# Patient Record
Sex: FEMALE | Race: WHITE | NOT HISPANIC OR LATINO | Employment: FULL TIME | URBAN - METROPOLITAN AREA
[De-identification: names, ages, dates, MRNs, and addresses within clinical notes are randomized per-mention and may not be internally consistent; named-entity substitution may affect disease eponyms.]

---

## 2019-06-28 ENCOUNTER — HOSPITAL ENCOUNTER (OUTPATIENT)
Dept: URGENT CARE | Facility: CLINIC | Age: 39
Discharge: HOME | End: 2019-06-28
Attending: PHYSICIAN ASSISTANT

## 2019-07-10 ENCOUNTER — HOSPITAL ENCOUNTER (OUTPATIENT)
Dept: OTHER | Facility: HOSPITAL | Age: 39
Discharge: HOME | End: 2019-07-10
Attending: EMERGENCY MEDICINE

## 2019-07-13 LAB
CONV QUANTIFERON TB GOLD: NEGATIVE
QUANTIFERON CRITERIA: NORMAL
QUANTIFERON MITOGEN VALUE: >10 IU/ML
QUANTIFERON NIL VALUE: 0.03 IU/ML
QUANTIFERON TB1 AG VALUE: 0.03 IU/ML
QUANTIFERON TB2 AG VALUE: 0.04 IU/ML

## 2019-08-05 ENCOUNTER — HOSPITAL ENCOUNTER (OUTPATIENT)
Dept: LAB | Facility: HOSPITAL | Age: 39
Discharge: HOME | End: 2019-08-05
Attending: PHYSICIAN ASSISTANT

## 2019-08-05 LAB
25(OH)D3 SERPL-MCNC: 38 NG/ML (ref 30–100)
ALBUMIN SERPL-MCNC: 4.4 G/DL (ref 3.5–5)
ALBUMIN/GLOB SERPL: 1.4 {RATIO} (ref 1.4–2.6)
ALP SERPL-CCNC: 132 U/L (ref 42–98)
ALT SERPL-CCNC: 33 U/L (ref 10–40)
ANION GAP SERPL CALC-SCNC: 18 MMOL/L (ref 8–19)
AST SERPL-CCNC: 26 U/L (ref 15–50)
BASOPHILS # BLD AUTO: 0.04 10*3/UL (ref 0–0.2)
BASOPHILS NFR BLD AUTO: 0.6 % (ref 0–3)
BILIRUB SERPL-MCNC: 0.37 MG/DL (ref 0.2–1.3)
BUN SERPL-MCNC: 11 MG/DL (ref 5–25)
BUN/CREAT SERPL: 17 {RATIO} (ref 6–20)
CALCIUM SERPL-MCNC: 9.7 MG/DL (ref 8.7–10.4)
CHLORIDE SERPL-SCNC: 103 MMOL/L (ref 99–111)
CONV ABS IMM GRAN: 0.02 10*3/UL (ref 0–0.2)
CONV CO2: 23 MMOL/L (ref 22–32)
CONV IMMATURE GRAN: 0.3 % (ref 0–1.8)
CONV TOTAL PROTEIN: 7.6 G/DL (ref 6.3–8.2)
CREAT UR-MCNC: 0.64 MG/DL (ref 0.5–0.9)
DEPRECATED RDW RBC AUTO: 44.9 FL (ref 36.4–46.3)
EOSINOPHIL # BLD AUTO: 0.32 10*3/UL (ref 0–0.7)
EOSINOPHIL # BLD AUTO: 4.7 % (ref 0–7)
ERYTHROCYTE [DISTWIDTH] IN BLOOD BY AUTOMATED COUNT: 13.5 % (ref 11.7–14.4)
GFR SERPLBLD BASED ON 1.73 SQ M-ARVRAT: >60 ML/MIN/{1.73_M2}
GLOBULIN UR ELPH-MCNC: 3.2 G/DL (ref 2–3.5)
GLUCOSE SERPL-MCNC: 100 MG/DL (ref 65–99)
HCT VFR BLD AUTO: 46.1 % (ref 37–47)
HGB BLD-MCNC: 15 G/DL (ref 12–16)
LYMPHOCYTES # BLD AUTO: 2.77 10*3/UL (ref 1–5)
LYMPHOCYTES NFR BLD AUTO: 40.6 % (ref 20–45)
MCH RBC QN AUTO: 29.5 PG (ref 27–31)
MCHC RBC AUTO-ENTMCNC: 32.5 G/DL (ref 33–37)
MCV RBC AUTO: 90.6 FL (ref 81–99)
MONOCYTES # BLD AUTO: 0.45 10*3/UL (ref 0.2–1.2)
MONOCYTES NFR BLD AUTO: 6.6 % (ref 3–10)
NEUTROPHILS # BLD AUTO: 3.23 10*3/UL (ref 2–8)
NEUTROPHILS NFR BLD AUTO: 47.2 % (ref 30–85)
NRBC CBCN: 0 % (ref 0–0.7)
OSMOLALITY SERPL CALC.SUM OF ELEC: 289 MOSM/KG (ref 273–304)
PLATELET # BLD AUTO: 261 10*3/UL (ref 130–400)
PMV BLD AUTO: 11 FL (ref 9.4–12.3)
POTASSIUM SERPL-SCNC: 4.3 MMOL/L (ref 3.5–5.3)
RBC # BLD AUTO: 5.09 10*6/UL (ref 4.2–5.4)
SODIUM SERPL-SCNC: 140 MMOL/L (ref 135–147)
T4 FREE SERPL-MCNC: 1.3 NG/DL (ref 0.9–1.8)
TSH SERPL-ACNC: 0.96 M[IU]/L (ref 0.27–4.2)
WBC # BLD AUTO: 6.83 10*3/UL (ref 4.8–10.8)

## 2020-02-24 ENCOUNTER — HOSPITAL ENCOUNTER (OUTPATIENT)
Dept: URGENT CARE | Facility: CLINIC | Age: 40
Discharge: HOME | End: 2020-02-24
Attending: NURSE PRACTITIONER

## 2020-02-26 LAB — BACTERIA SPEC AEROBE CULT: NORMAL

## 2020-08-12 ENCOUNTER — HOSPITAL ENCOUNTER (OUTPATIENT)
Dept: LAB | Facility: HOSPITAL | Age: 40
Discharge: HOME | End: 2020-08-12
Attending: PHYSICIAN ASSISTANT

## 2020-08-12 LAB
25(OH)D3 SERPL-MCNC: 29 NG/ML (ref 30–100)
ALBUMIN SERPL-MCNC: 4.2 G/DL (ref 3.5–5)
ALBUMIN/GLOB SERPL: 1.4 {RATIO} (ref 1.4–2.6)
ALP SERPL-CCNC: 117 U/L (ref 42–98)
ALT SERPL-CCNC: 31 U/L (ref 10–40)
ANION GAP SERPL CALC-SCNC: 24 MMOL/L (ref 8–19)
AST SERPL-CCNC: 24 U/L (ref 15–50)
BASOPHILS # BLD AUTO: 0.04 10*3/UL (ref 0–0.2)
BASOPHILS NFR BLD AUTO: 0.5 % (ref 0–3)
BILIRUB SERPL-MCNC: 0.26 MG/DL (ref 0.2–1.3)
BUN SERPL-MCNC: 12 MG/DL (ref 5–25)
BUN/CREAT SERPL: 15 {RATIO} (ref 6–20)
CALCIUM SERPL-MCNC: 10 MG/DL (ref 8.7–10.4)
CHLORIDE SERPL-SCNC: 109 MMOL/L (ref 99–111)
CHOLEST SERPL-MCNC: 255 MG/DL (ref 107–200)
CHOLEST/HDLC SERPL: 5.2 {RATIO} (ref 3–6)
CONV ABS IMM GRAN: 0.01 10*3/UL (ref 0–0.2)
CONV CO2: 18 MMOL/L (ref 22–32)
CONV IMMATURE GRAN: 0.1 % (ref 0–1.8)
CONV TOTAL PROTEIN: 7.3 G/DL (ref 6.3–8.2)
CREAT UR-MCNC: 0.78 MG/DL (ref 0.5–0.9)
DEPRECATED RDW RBC AUTO: 46.8 FL (ref 36.4–46.3)
EOSINOPHIL # BLD AUTO: 0.34 10*3/UL (ref 0–0.7)
EOSINOPHIL # BLD AUTO: 4.5 % (ref 0–7)
ERYTHROCYTE [DISTWIDTH] IN BLOOD BY AUTOMATED COUNT: 13.9 % (ref 11.7–14.4)
EST. AVERAGE GLUCOSE BLD GHB EST-MCNC: 131 MG/DL
GFR SERPLBLD BASED ON 1.73 SQ M-ARVRAT: >60 ML/MIN/{1.73_M2}
GLOBULIN UR ELPH-MCNC: 3.1 G/DL (ref 2–3.5)
GLUCOSE SERPL-MCNC: 113 MG/DL (ref 65–99)
HBA1C MFR BLD: 6.2 % (ref 3.5–5.7)
HCT VFR BLD AUTO: 49.3 % (ref 37–47)
HDLC SERPL-MCNC: 49 MG/DL (ref 40–60)
HGB BLD-MCNC: 15.6 G/DL (ref 12–16)
LDLC SERPL CALC-MCNC: 171 MG/DL (ref 70–100)
LYMPHOCYTES # BLD AUTO: 3.3 10*3/UL (ref 1–5)
LYMPHOCYTES NFR BLD AUTO: 43.7 % (ref 20–45)
MCH RBC QN AUTO: 29.3 PG (ref 27–31)
MCHC RBC AUTO-ENTMCNC: 31.6 G/DL (ref 33–37)
MCV RBC AUTO: 92.5 FL (ref 81–99)
MONOCYTES # BLD AUTO: 0.49 10*3/UL (ref 0.2–1.2)
MONOCYTES NFR BLD AUTO: 6.5 % (ref 3–10)
NEUTROPHILS # BLD AUTO: 3.37 10*3/UL (ref 2–8)
NEUTROPHILS NFR BLD AUTO: 44.7 % (ref 30–85)
NRBC CBCN: 0 % (ref 0–0.7)
OSMOLALITY SERPL CALC.SUM OF ELEC: 303 MOSM/KG (ref 273–304)
PLATELET # BLD AUTO: 216 10*3/UL (ref 130–400)
PMV BLD AUTO: 11.7 FL (ref 9.4–12.3)
POTASSIUM SERPL-SCNC: 4.7 MMOL/L (ref 3.5–5.3)
RBC # BLD AUTO: 5.33 10*6/UL (ref 4.2–5.4)
SODIUM SERPL-SCNC: 146 MMOL/L (ref 135–147)
T4 FREE SERPL-MCNC: 1.2 NG/DL (ref 0.9–1.8)
TRIGL SERPL-MCNC: 177 MG/DL (ref 40–150)
TSH SERPL-ACNC: 1.92 M[IU]/L (ref 0.27–4.2)
VLDLC SERPL-MCNC: 35 MG/DL (ref 5–37)
WBC # BLD AUTO: 7.55 10*3/UL (ref 4.8–10.8)

## 2020-09-04 ENCOUNTER — HOSPITAL ENCOUNTER (OUTPATIENT)
Dept: GENERAL RADIOLOGY | Facility: HOSPITAL | Age: 40
Discharge: HOME | End: 2020-09-04
Attending: PHYSICIAN ASSISTANT

## 2020-11-03 ENCOUNTER — HOSPITAL ENCOUNTER (OUTPATIENT)
Dept: LAB | Facility: HOSPITAL | Age: 40
Discharge: HOME | End: 2020-11-03
Attending: PHYSICIAN ASSISTANT

## 2020-11-03 LAB
ALBUMIN SERPL-MCNC: 4.1 G/DL (ref 3.5–5)
ALBUMIN/GLOB SERPL: 1.3 {RATIO} (ref 1.4–2.6)
ALP SERPL-CCNC: 112 U/L (ref 42–98)
ALT SERPL-CCNC: 91 U/L (ref 10–40)
ANION GAP SERPL CALC-SCNC: 15 MMOL/L (ref 8–19)
AST SERPL-CCNC: 46 U/L (ref 15–50)
BASOPHILS # BLD AUTO: 0.03 10*3/UL (ref 0–0.2)
BASOPHILS NFR BLD AUTO: 0.4 % (ref 0–3)
BILIRUB SERPL-MCNC: 0.45 MG/DL (ref 0.2–1.3)
BUN SERPL-MCNC: 8 MG/DL (ref 5–25)
BUN/CREAT SERPL: 11 {RATIO} (ref 6–20)
CALCIUM SERPL-MCNC: 9.6 MG/DL (ref 8.7–10.4)
CHLORIDE SERPL-SCNC: 108 MMOL/L (ref 99–111)
CONV ABS IMM GRAN: 0.02 10*3/UL (ref 0–0.2)
CONV CO2: 20 MMOL/L (ref 22–32)
CONV IMMATURE GRAN: 0.3 % (ref 0–1.8)
CONV TOTAL PROTEIN: 7.2 G/DL (ref 6.3–8.2)
CREAT UR-MCNC: 0.76 MG/DL (ref 0.5–0.9)
DEPRECATED RDW RBC AUTO: 46.2 FL (ref 36.4–46.3)
EOSINOPHIL # BLD AUTO: 0.14 10*3/UL (ref 0–0.7)
EOSINOPHIL # BLD AUTO: 1.8 % (ref 0–7)
ERYTHROCYTE [DISTWIDTH] IN BLOOD BY AUTOMATED COUNT: 13.5 % (ref 11.7–14.4)
GFR SERPLBLD BASED ON 1.73 SQ M-ARVRAT: >60 ML/MIN/{1.73_M2}
GLOBULIN UR ELPH-MCNC: 3.1 G/DL (ref 2–3.5)
GLUCOSE SERPL-MCNC: 102 MG/DL (ref 65–99)
HCT VFR BLD AUTO: 47.3 % (ref 37–47)
HGB BLD-MCNC: 15 G/DL (ref 12–16)
LYMPHOCYTES # BLD AUTO: 2.85 10*3/UL (ref 1–5)
LYMPHOCYTES NFR BLD AUTO: 37.5 % (ref 20–45)
MCH RBC QN AUTO: 29.5 PG (ref 27–31)
MCHC RBC AUTO-ENTMCNC: 31.7 G/DL (ref 33–37)
MCV RBC AUTO: 92.9 FL (ref 81–99)
MONOCYTES # BLD AUTO: 0.52 10*3/UL (ref 0.2–1.2)
MONOCYTES NFR BLD AUTO: 6.9 % (ref 3–10)
NEUTROPHILS # BLD AUTO: 4.03 10*3/UL (ref 2–8)
NEUTROPHILS NFR BLD AUTO: 53.1 % (ref 30–85)
NRBC CBCN: 0 % (ref 0–0.7)
OSMOLALITY SERPL CALC.SUM OF ELEC: 287 MOSM/KG (ref 273–304)
PLATELET # BLD AUTO: 220 10*3/UL (ref 130–400)
PMV BLD AUTO: 10.6 FL (ref 9.4–12.3)
POTASSIUM SERPL-SCNC: 4 MMOL/L (ref 3.5–5.3)
RBC # BLD AUTO: 5.09 10*6/UL (ref 4.2–5.4)
SODIUM SERPL-SCNC: 139 MMOL/L (ref 135–147)
WBC # BLD AUTO: 7.59 10*3/UL (ref 4.8–10.8)

## 2020-12-18 ENCOUNTER — HOSPITAL ENCOUNTER (OUTPATIENT)
Dept: GENERAL RADIOLOGY | Facility: HOSPITAL | Age: 40
Discharge: HOME | End: 2020-12-18
Attending: PHYSICIAN ASSISTANT

## 2021-05-03 ENCOUNTER — HOSPITAL ENCOUNTER (OUTPATIENT)
Dept: URGENT CARE | Facility: CLINIC | Age: 41
Discharge: HOME | End: 2021-05-03
Attending: EMERGENCY MEDICINE

## 2021-07-07 RX ORDER — ALBUTEROL SULFATE 90 UG/1
2 AEROSOL, METERED RESPIRATORY (INHALATION) EVERY 4 HOURS PRN
Qty: 1 G | Refills: 5 | Status: SHIPPED | OUTPATIENT
Start: 2021-07-07 | End: 2021-08-06

## 2021-09-10 RX ORDER — MONTELUKAST SODIUM 10 MG/1
10 TABLET ORAL NIGHTLY
COMMUNITY
End: 2021-09-10 | Stop reason: SDUPTHER

## 2021-09-12 RX ORDER — MONTELUKAST SODIUM 10 MG/1
10 TABLET ORAL NIGHTLY
Qty: 30 TABLET | Refills: 3 | Status: SHIPPED | OUTPATIENT
Start: 2021-09-12 | End: 2022-02-16

## 2021-10-25 RX ORDER — ESCITALOPRAM OXALATE 10 MG/1
TABLET ORAL
Qty: 30 TABLET | Refills: 0 | Status: SHIPPED | OUTPATIENT
Start: 2021-10-25 | End: 2022-06-27

## 2021-11-01 ENCOUNTER — HOSPITAL ENCOUNTER (INPATIENT)
Facility: HOSPITAL | Age: 41
LOS: 3 days | Discharge: HOME | End: 2021-11-04
Attending: EMERGENCY MEDICINE | Admitting: INTERNAL MEDICINE

## 2021-11-01 ENCOUNTER — APPOINTMENT (OUTPATIENT)
Dept: GENERAL RADIOLOGY | Facility: HOSPITAL | Age: 41
End: 2021-11-01

## 2021-11-01 DIAGNOSIS — J18.9 PNEUMONIA OF BOTH LOWER LOBES DUE TO INFECTIOUS ORGANISM: Primary | ICD-10-CM

## 2021-11-01 LAB
ALBUMIN SERPL-MCNC: 4 G/DL (ref 3.5–5.2)
ALBUMIN/GLOB SERPL: 1.3 G/DL
ALP SERPL-CCNC: 97 U/L (ref 39–117)
ALT SERPL W P-5'-P-CCNC: 32 U/L (ref 1–33)
ANION GAP SERPL CALCULATED.3IONS-SCNC: 11.7 MMOL/L (ref 5–15)
AST SERPL-CCNC: 22 U/L (ref 1–32)
BASOPHILS # BLD AUTO: 0.05 10*3/MM3 (ref 0–0.2)
BASOPHILS NFR BLD AUTO: 0.5 % (ref 0–1.5)
BILIRUB SERPL-MCNC: 0.3 MG/DL (ref 0–1.2)
BUN SERPL-MCNC: 14 MG/DL (ref 6–20)
BUN/CREAT SERPL: 23.7 (ref 7–25)
CALCIUM SPEC-SCNC: 8.9 MG/DL (ref 8.6–10.5)
CHLORIDE SERPL-SCNC: 107 MMOL/L (ref 98–107)
CO2 SERPL-SCNC: 22.3 MMOL/L (ref 22–29)
CREAT SERPL-MCNC: 0.59 MG/DL (ref 0.57–1)
DEPRECATED RDW RBC AUTO: 46.4 FL (ref 37–54)
EOSINOPHIL # BLD AUTO: 0.5 10*3/MM3 (ref 0–0.4)
EOSINOPHIL NFR BLD AUTO: 5.3 % (ref 0.3–6.2)
ERYTHROCYTE [DISTWIDTH] IN BLOOD BY AUTOMATED COUNT: 14.1 % (ref 12.3–15.4)
GFR SERPL CREATININE-BSD FRML MDRD: 111 ML/MIN/1.73
GLOBULIN UR ELPH-MCNC: 3.1 GM/DL
GLUCOSE SERPL-MCNC: 104 MG/DL (ref 65–99)
HCT VFR BLD AUTO: 45.1 % (ref 34–46.6)
HGB BLD-MCNC: 14.7 G/DL (ref 12–15.9)
HOLD SPECIMEN: NORMAL
HOLD SPECIMEN: NORMAL
IMM GRANULOCYTES # BLD AUTO: 0.03 10*3/MM3 (ref 0–0.05)
IMM GRANULOCYTES NFR BLD AUTO: 0.3 % (ref 0–0.5)
LYMPHOCYTES # BLD AUTO: 2.81 10*3/MM3 (ref 0.7–3.1)
LYMPHOCYTES NFR BLD AUTO: 29.7 % (ref 19.6–45.3)
M PNEUMO IGM SER QL: NEGATIVE
MCH RBC QN AUTO: 29.4 PG (ref 26.6–33)
MCHC RBC AUTO-ENTMCNC: 32.6 G/DL (ref 31.5–35.7)
MCV RBC AUTO: 90.2 FL (ref 79–97)
MONOCYTES # BLD AUTO: 0.81 10*3/MM3 (ref 0.1–0.9)
MONOCYTES NFR BLD AUTO: 8.6 % (ref 5–12)
NEUTROPHILS NFR BLD AUTO: 5.25 10*3/MM3 (ref 1.7–7)
NEUTROPHILS NFR BLD AUTO: 55.6 % (ref 42.7–76)
NRBC BLD AUTO-RTO: 0 /100 WBC (ref 0–0.2)
NT-PROBNP SERPL-MCNC: 59.2 PG/ML (ref 0–450)
PLATELET # BLD AUTO: 206 10*3/MM3 (ref 140–450)
PMV BLD AUTO: 10.8 FL (ref 6–12)
POTASSIUM SERPL-SCNC: 4 MMOL/L (ref 3.5–5.2)
PROT SERPL-MCNC: 7.1 G/DL (ref 6–8.5)
RBC # BLD AUTO: 5 10*6/MM3 (ref 3.77–5.28)
SARS-COV-2 N GENE RESP QL NAA+PROBE: NOT DETECTED
SODIUM SERPL-SCNC: 141 MMOL/L (ref 136–145)
TROPONIN T SERPL-MCNC: <0.01 NG/ML (ref 0–0.03)
WBC # BLD AUTO: 9.45 10*3/MM3 (ref 3.4–10.8)
WHOLE BLOOD HOLD SPECIMEN: NORMAL
WHOLE BLOOD HOLD SPECIMEN: NORMAL

## 2021-11-01 PROCEDURE — 25010000002 ENOXAPARIN PER 10 MG: Performed by: INTERNAL MEDICINE

## 2021-11-01 PROCEDURE — 87899 AGENT NOS ASSAY W/OPTIC: CPT | Performed by: INTERNAL MEDICINE

## 2021-11-01 PROCEDURE — 94760 N-INVAS EAR/PLS OXIMETRY 1: CPT

## 2021-11-01 PROCEDURE — 87635 SARS-COV-2 COVID-19 AMP PRB: CPT | Performed by: EMERGENCY MEDICINE

## 2021-11-01 PROCEDURE — 83880 ASSAY OF NATRIURETIC PEPTIDE: CPT | Performed by: EMERGENCY MEDICINE

## 2021-11-01 PROCEDURE — 36415 COLL VENOUS BLD VENIPUNCTURE: CPT

## 2021-11-01 PROCEDURE — 94799 UNLISTED PULMONARY SVC/PX: CPT

## 2021-11-01 PROCEDURE — 71045 X-RAY EXAM CHEST 1 VIEW: CPT

## 2021-11-01 PROCEDURE — 25010000002 AZITHROMYCIN PER 500 MG: Performed by: EMERGENCY MEDICINE

## 2021-11-01 PROCEDURE — 25010000002 METHYLPREDNISOLONE PER 125 MG: Performed by: EMERGENCY MEDICINE

## 2021-11-01 PROCEDURE — 25010000002 HYDROMORPHONE 1 MG/ML SOLUTION: Performed by: EMERGENCY MEDICINE

## 2021-11-01 PROCEDURE — 80053 COMPREHEN METABOLIC PANEL: CPT | Performed by: EMERGENCY MEDICINE

## 2021-11-01 PROCEDURE — 86738 MYCOPLASMA ANTIBODY: CPT | Performed by: INTERNAL MEDICINE

## 2021-11-01 PROCEDURE — 25010000002 METHYLPREDNISOLONE PER 125 MG: Performed by: INTERNAL MEDICINE

## 2021-11-01 PROCEDURE — 94640 AIRWAY INHALATION TREATMENT: CPT

## 2021-11-01 PROCEDURE — 85025 COMPLETE CBC W/AUTO DIFF WBC: CPT | Performed by: EMERGENCY MEDICINE

## 2021-11-01 PROCEDURE — 93005 ELECTROCARDIOGRAM TRACING: CPT | Performed by: EMERGENCY MEDICINE

## 2021-11-01 PROCEDURE — 99285 EMERGENCY DEPT VISIT HI MDM: CPT

## 2021-11-01 PROCEDURE — 25010000002 KETOROLAC TROMETHAMINE PER 15 MG: Performed by: EMERGENCY MEDICINE

## 2021-11-01 PROCEDURE — 84484 ASSAY OF TROPONIN QUANT: CPT | Performed by: EMERGENCY MEDICINE

## 2021-11-01 PROCEDURE — 25010000002 CEFTRIAXONE PER 250 MG: Performed by: EMERGENCY MEDICINE

## 2021-11-01 RX ORDER — ONDANSETRON 2 MG/ML
4 INJECTION INTRAMUSCULAR; INTRAVENOUS EVERY 6 HOURS PRN
Status: DISCONTINUED | OUTPATIENT
Start: 2021-11-01 | End: 2021-11-04 | Stop reason: HOSPADM

## 2021-11-01 RX ORDER — CEFTRIAXONE SODIUM 1 G/50ML
1 INJECTION, SOLUTION INTRAVENOUS EVERY 24 HOURS
Status: DISCONTINUED | OUTPATIENT
Start: 2021-11-02 | End: 2021-11-04 | Stop reason: HOSPADM

## 2021-11-01 RX ORDER — FAMOTIDINE 10 MG/ML
20 INJECTION, SOLUTION INTRAVENOUS EVERY 12 HOURS SCHEDULED
Status: DISCONTINUED | OUTPATIENT
Start: 2021-11-01 | End: 2021-11-04 | Stop reason: HOSPADM

## 2021-11-01 RX ORDER — METHYLPREDNISOLONE SODIUM SUCCINATE 125 MG/2ML
125 INJECTION, POWDER, LYOPHILIZED, FOR SOLUTION INTRAMUSCULAR; INTRAVENOUS EVERY 8 HOURS
Status: DISCONTINUED | OUTPATIENT
Start: 2021-11-01 | End: 2021-11-04 | Stop reason: HOSPADM

## 2021-11-01 RX ORDER — IPRATROPIUM BROMIDE AND ALBUTEROL SULFATE 2.5; .5 MG/3ML; MG/3ML
3 SOLUTION RESPIRATORY (INHALATION) ONCE
Status: COMPLETED | OUTPATIENT
Start: 2021-11-01 | End: 2021-11-01

## 2021-11-01 RX ORDER — KETOROLAC TROMETHAMINE 30 MG/ML
30 INJECTION, SOLUTION INTRAMUSCULAR; INTRAVENOUS ONCE
Status: COMPLETED | OUTPATIENT
Start: 2021-11-01 | End: 2021-11-01

## 2021-11-01 RX ORDER — ACETAMINOPHEN 325 MG/1
650 TABLET ORAL EVERY 4 HOURS PRN
Status: DISCONTINUED | OUTPATIENT
Start: 2021-11-01 | End: 2021-11-04 | Stop reason: HOSPADM

## 2021-11-01 RX ORDER — GUAIFENESIN 600 MG/1
600 TABLET, EXTENDED RELEASE ORAL EVERY 12 HOURS SCHEDULED
Status: DISCONTINUED | OUTPATIENT
Start: 2021-11-01 | End: 2021-11-04 | Stop reason: HOSPADM

## 2021-11-01 RX ORDER — SODIUM CHLORIDE 0.9 % (FLUSH) 0.9 %
10 SYRINGE (ML) INJECTION AS NEEDED
Status: DISCONTINUED | OUTPATIENT
Start: 2021-11-01 | End: 2021-11-04 | Stop reason: HOSPADM

## 2021-11-01 RX ORDER — IPRATROPIUM BROMIDE AND ALBUTEROL SULFATE 2.5; .5 MG/3ML; MG/3ML
3 SOLUTION RESPIRATORY (INHALATION)
Status: DISCONTINUED | OUTPATIENT
Start: 2021-11-01 | End: 2021-11-04 | Stop reason: HOSPADM

## 2021-11-01 RX ORDER — MONTELUKAST SODIUM 10 MG/1
10 TABLET ORAL DAILY
Status: DISCONTINUED | OUTPATIENT
Start: 2021-11-03 | End: 2021-11-01

## 2021-11-01 RX ORDER — ACETAMINOPHEN 160 MG/5ML
650 SOLUTION ORAL EVERY 4 HOURS PRN
Status: DISCONTINUED | OUTPATIENT
Start: 2021-11-01 | End: 2021-11-04 | Stop reason: HOSPADM

## 2021-11-01 RX ORDER — NITROGLYCERIN 0.4 MG/1
0.4 TABLET SUBLINGUAL
Status: DISCONTINUED | OUTPATIENT
Start: 2021-11-01 | End: 2021-11-04 | Stop reason: HOSPADM

## 2021-11-01 RX ORDER — METHYLPREDNISOLONE SODIUM SUCCINATE 125 MG/2ML
125 INJECTION, POWDER, LYOPHILIZED, FOR SOLUTION INTRAMUSCULAR; INTRAVENOUS ONCE
Status: COMPLETED | OUTPATIENT
Start: 2021-11-01 | End: 2021-11-01

## 2021-11-01 RX ORDER — ESCITALOPRAM OXALATE 10 MG/1
10 TABLET ORAL DAILY
Status: DISCONTINUED | OUTPATIENT
Start: 2021-11-02 | End: 2021-11-04 | Stop reason: HOSPADM

## 2021-11-01 RX ORDER — MONTELUKAST SODIUM 10 MG/1
10 TABLET ORAL DAILY
Status: DISCONTINUED | OUTPATIENT
Start: 2021-11-02 | End: 2021-11-04 | Stop reason: HOSPADM

## 2021-11-01 RX ORDER — ACETAMINOPHEN 650 MG/1
650 SUPPOSITORY RECTAL EVERY 4 HOURS PRN
Status: DISCONTINUED | OUTPATIENT
Start: 2021-11-01 | End: 2021-11-04 | Stop reason: HOSPADM

## 2021-11-01 RX ORDER — MONTELUKAST SODIUM 10 MG/1
10 TABLET ORAL NIGHTLY
Status: DISCONTINUED | OUTPATIENT
Start: 2021-11-01 | End: 2021-11-01

## 2021-11-01 RX ORDER — GUAIFENESIN/DEXTROMETHORPHAN 100-10MG/5
10 SYRUP ORAL EVERY 6 HOURS PRN
Status: DISCONTINUED | OUTPATIENT
Start: 2021-11-01 | End: 2021-11-04 | Stop reason: HOSPADM

## 2021-11-01 RX ORDER — CEFTRIAXONE SODIUM 1 G/50ML
1 INJECTION, SOLUTION INTRAVENOUS ONCE
Status: COMPLETED | OUTPATIENT
Start: 2021-11-01 | End: 2021-11-01

## 2021-11-01 RX ORDER — SODIUM CHLORIDE 0.9 % (FLUSH) 0.9 %
10 SYRINGE (ML) INJECTION EVERY 12 HOURS SCHEDULED
Status: DISCONTINUED | OUTPATIENT
Start: 2021-11-01 | End: 2021-11-04 | Stop reason: HOSPADM

## 2021-11-01 RX ADMIN — METHYLPREDNISOLONE SODIUM SUCCINATE 125 MG: 125 INJECTION, POWDER, FOR SOLUTION INTRAMUSCULAR; INTRAVENOUS at 22:54

## 2021-11-01 RX ADMIN — AZITHROMYCIN 500 MG: 500 INJECTION, POWDER, LYOPHILIZED, FOR SOLUTION INTRAVENOUS at 20:15

## 2021-11-01 RX ADMIN — ENOXAPARIN SODIUM 40 MG: 40 INJECTION SUBCUTANEOUS at 22:55

## 2021-11-01 RX ADMIN — IPRATROPIUM BROMIDE AND ALBUTEROL SULFATE 3 ML: .5; 2.5 SOLUTION RESPIRATORY (INHALATION) at 14:23

## 2021-11-01 RX ADMIN — IPRATROPIUM BROMIDE AND ALBUTEROL SULFATE 3 ML: 2.5; .5 SOLUTION RESPIRATORY (INHALATION) at 20:00

## 2021-11-01 RX ADMIN — CEFTRIAXONE SODIUM 1 G: 1 INJECTION, SOLUTION INTRAVENOUS at 19:53

## 2021-11-01 RX ADMIN — IPRATROPIUM BROMIDE AND ALBUTEROL SULFATE 3 ML: 2.5; .5 SOLUTION RESPIRATORY (INHALATION) at 14:23

## 2021-11-01 RX ADMIN — METHYLPREDNISOLONE SODIUM SUCCINATE 125 MG: 125 INJECTION, POWDER, FOR SOLUTION INTRAMUSCULAR; INTRAVENOUS at 14:52

## 2021-11-01 RX ADMIN — KETOROLAC TROMETHAMINE 30 MG: 30 INJECTION, SOLUTION INTRAMUSCULAR at 16:10

## 2021-11-01 RX ADMIN — FAMOTIDINE 20 MG: 10 INJECTION INTRAVENOUS at 22:54

## 2021-11-01 RX ADMIN — IPRATROPIUM BROMIDE AND ALBUTEROL SULFATE 3 ML: 2.5; .5 SOLUTION RESPIRATORY (INHALATION) at 19:55

## 2021-11-01 RX ADMIN — HYDROMORPHONE HYDROCHLORIDE 1 MG: 1 INJECTION, SOLUTION INTRAMUSCULAR; INTRAVENOUS; SUBCUTANEOUS at 19:53

## 2021-11-01 RX ADMIN — GUAIFENESIN 600 MG: 600 TABLET ORAL at 22:54

## 2021-11-01 RX ADMIN — GUAIFENESIN AND DEXTROMETHORPHAN 10 ML: 100; 10 SYRUP ORAL at 22:54

## 2021-11-01 RX ADMIN — SODIUM CHLORIDE, PRESERVATIVE FREE 10 ML: 5 INJECTION INTRAVENOUS at 22:56

## 2021-11-01 NOTE — PROGRESS NOTES
"Chief Complaint/ HPI: Transitional care visit, hospital stay November 1 through November 4, 2021, for bilateral pneumonia, asthma exacerbation, patient is doing much better finishing up a course of steroids, has finished antibiotics, medications reviewed discharge summary noted with patient, she is feeling better overall still tired fatigued,  Hospital Follow Up Visit      Objective   Vital Signs  Vitals:    11/08/21 1550   BP: 142/91   Pulse: 103   Temp: 97.5 °F (36.4 °C)   SpO2: 98%   Weight: 116 kg (255 lb 6.4 oz)   Height: 167.6 cm (65.98\")      Body mass index is 41.24 kg/m².  Review of Systems   Constitutional: Negative.    HENT: Negative.    Eyes: Negative.    Respiratory: Negative.    Cardiovascular: Negative.    Gastrointestinal: Negative.    Endocrine: Negative.    Genitourinary: Negative.    Musculoskeletal: Negative.    Allergic/Immunologic: Negative.    Neurological: Negative.    Hematological: Negative.    Psychiatric/Behavioral: Negative.    , Fatigue, still short of breath at times still some wheezing noted overall improved  Physical Exam  Constitutional:       General: She is not in acute distress.     Appearance: Normal appearance.   HENT:      Head: Normocephalic.      Mouth/Throat:      Mouth: Mucous membranes are moist.   Eyes:      Conjunctiva/sclera: Conjunctivae normal.      Pupils: Pupils are equal, round, and reactive to light.   Cardiovascular:      Rate and Rhythm: Normal rate and regular rhythm.      Pulses: Normal pulses.      Heart sounds: Normal heart sounds.   Pulmonary:      Effort: Pulmonary effort is normal.      Breath sounds: Normal breath sounds.   Abdominal:      General: Abdomen is flat. Bowel sounds are normal.      Palpations: Abdomen is soft.   Musculoskeletal:         General: No swelling. Normal range of motion.      Cervical back: Neck supple.   Skin:     General: Skin is warm and dry.      Coloration: Skin is not jaundiced.   Neurological:      General: No focal " deficit present.      Mental Status: She is alert and oriented to person, place, and time. Mental status is at baseline.   Psychiatric:         Mood and Affect: Mood normal.         Behavior: Behavior normal.         Thought Content: Thought content normal.         Judgment: Judgment normal.     Faint expiratory wheeze right upper lung posterior,  Result Review :   Lab Results   Component Value Date    PROBNP 59.2 11/01/2021    BNP 53 05/05/2021    BNP 34 10/16/2020     CMP    CMP 11/2/21 11/3/21 11/4/21   Glucose 162 (A) 167 (A) 152 (A)   BUN 17 16 20   Creatinine 0.66 0.49 (A) 0.54 (A)   eGFR Non African Am 98 138 123   Sodium 139 137 135 (A)   Potassium 4.6 4.6 4.6   Chloride 105 105 103   Calcium 9.3 8.9 8.6   (A) Abnormal value       Comments are available for some flowsheets but are not being displayed.           CBC w/diff    CBC w/Diff 5/5/21   WBC 12.07 (A)   RBC 5.23   Hemoglobin 15.5   Hematocrit 46.1   MCV 88.1   MCH 29.6   MCHC 33.6   RDW 14.1   Platelets 233   Neutrophil Rel % 77.1   Lymphocyte Rel % 18.6 (A)   Monocyte Rel % 2.8 (A)   Eosinophil Rel % 0.2   Basophil Rel % 0.2   (A) Abnormal value                Lab Results   Component Value Date    TSH 0.056 (L) 11/04/2021    TSH 0.142 (L) 10/18/2020    TSH 1.920 08/12/2020      Lab Results   Component Value Date    FREET4 0.89 (L) 11/04/2021    FREET4 1.1 10/18/2020    FREET4 1.2 08/12/2020                            ICD-10-CM ICD-9-CM   1. Pneumonia of both lower lobes due to infectious organism  J18.9 486   2. Moderate asthma with acute exacerbation, unspecified whether persistent  J45.901 493.92   3. Anxiety, generalized  F41.1 300.02   4. Major depressive disorder, recurrent, moderate (HCC)  F33.1 296.32   5. Mixed hyperlipidemia  E78.2 272.2   6. Vitamin D deficiency  E55.9 268.9   7. IFG (impaired fasting glucose)  R73.01 790.21   8. Morbid (severe) obesity due to excess calories (HCC)  E66.01 278.01   9. S/P gastric sleeve procedure  Z90.3  V45.75       Assessment and Plan    Diagnoses and all orders for this visit:    1. Pneumonia of both lower lobes due to infectious organism (Primary)  -     escitalopram (Lexapro) 10 MG tablet; Take 1 tablet by mouth Daily.  Dispense: 90 tablet; Refill: 3  -     Hemoglobin A1c; Future  -     Comprehensive Metabolic Panel; Future  -     CBC & Differential; Future  -     Lipid Panel; Future  -     Vitamin D 25 Hydroxy; Future  -     cyanocobalamin injection 1,000 mcg    2. Moderate asthma with acute exacerbation, unspecified whether persistent  -     escitalopram (Lexapro) 10 MG tablet; Take 1 tablet by mouth Daily.  Dispense: 90 tablet; Refill: 3  -     Hemoglobin A1c; Future  -     Comprehensive Metabolic Panel; Future  -     CBC & Differential; Future  -     Lipid Panel; Future  -     Vitamin D 25 Hydroxy; Future  -     cyanocobalamin injection 1,000 mcg    3. Anxiety, generalized  -     escitalopram (Lexapro) 10 MG tablet; Take 1 tablet by mouth Daily.  Dispense: 90 tablet; Refill: 3  -     Hemoglobin A1c; Future  -     Comprehensive Metabolic Panel; Future  -     CBC & Differential; Future  -     Lipid Panel; Future  -     Vitamin D 25 Hydroxy; Future  -     cyanocobalamin injection 1,000 mcg    4. Major depressive disorder, recurrent, moderate (HCC)  -     escitalopram (Lexapro) 10 MG tablet; Take 1 tablet by mouth Daily.  Dispense: 90 tablet; Refill: 3  -     Hemoglobin A1c; Future  -     Comprehensive Metabolic Panel; Future  -     CBC & Differential; Future  -     Lipid Panel; Future  -     Vitamin D 25 Hydroxy; Future  -     cyanocobalamin injection 1,000 mcg    5. Mixed hyperlipidemia  -     escitalopram (Lexapro) 10 MG tablet; Take 1 tablet by mouth Daily.  Dispense: 90 tablet; Refill: 3  -     Hemoglobin A1c; Future  -     Comprehensive Metabolic Panel; Future  -     CBC & Differential; Future  -     Lipid Panel; Future  -     Vitamin D 25 Hydroxy; Future  -     cyanocobalamin injection 1,000 mcg    6.  Vitamin D deficiency  -     escitalopram (Lexapro) 10 MG tablet; Take 1 tablet by mouth Daily.  Dispense: 90 tablet; Refill: 3  -     Hemoglobin A1c; Future  -     Comprehensive Metabolic Panel; Future  -     CBC & Differential; Future  -     Lipid Panel; Future  -     Vitamin D 25 Hydroxy; Future  -     cyanocobalamin injection 1,000 mcg    7. IFG (impaired fasting glucose)  -     escitalopram (Lexapro) 10 MG tablet; Take 1 tablet by mouth Daily.  Dispense: 90 tablet; Refill: 3  -     Hemoglobin A1c; Future  -     Comprehensive Metabolic Panel; Future  -     CBC & Differential; Future  -     Lipid Panel; Future  -     Vitamin D 25 Hydroxy; Future  -     cyanocobalamin injection 1,000 mcg    8. Morbid (severe) obesity due to excess calories (HCC)  -     escitalopram (Lexapro) 10 MG tablet; Take 1 tablet by mouth Daily.  Dispense: 90 tablet; Refill: 3  -     Hemoglobin A1c; Future  -     Comprehensive Metabolic Panel; Future  -     CBC & Differential; Future  -     Lipid Panel; Future  -     Vitamin D 25 Hydroxy; Future  -     cyanocobalamin injection 1,000 mcg    9. S/P gastric sleeve procedure  -     escitalopram (Lexapro) 10 MG tablet; Take 1 tablet by mouth Daily.  Dispense: 90 tablet; Refill: 3  -     Hemoglobin A1c; Future  -     Comprehensive Metabolic Panel; Future  -     CBC & Differential; Future  -     Lipid Panel; Future  -     Vitamin D 25 Hydroxy; Future  -     cyanocobalamin injection 1,000 mcg         Bilateral basilar pneumonia, previously vaccinated for Covid, with positive booster dosing recently, continue day #3 IV antibiotics, breathing treatments, white count elevated due to steroids most likely, but improved on the day of discharge to 15, low back pain and hematuria last night, urinalysis does show evidence of hematuria so will order renal ultrasound --- renal ultrasound showed no abnormalities, normal-sized kidneys without hydronephrosis, November 4, 2021     Asthma exacerbation/COPD  exacerbation with tobacco abuse, patient does not want nicotine patch we will continue current treatment we will send home with steroids, nebulizer treatments, she already has the machine we will do Pulmicort and duo nebs along with a tapering dose of steroids, she already has Singulair at home and that we will continue     Headaches chronic recurrent, headaches most likely tension chronic, MRI 2018 and 2020 October did not show any acute abnormalities, would consider a repeat and/or trial of amitriptyline at bedtime 10 mg starting November 3, patient will go home with Elavil, may need increasing dose over the next several weeks, discussed with patient at time of discharge     Anxiety depression ---cont Lexapro ,  will send home with Ativan 0.5 every 6 as needed #30 for anxiety issues that she is worried about with her use of the steroids, November 4, 2021     Polycystic ovary syndrome she is going to go home on Metformin due to elevated blood sugars and the fact that she will be on steroids for another 5 to 6 days, November 4, 2021     Vitamin D deficiency     Mixed hyperlipidemia---no rx      Obesity     Tobacco abuse, discussed counseled on quitting, November 2 and 3,     Impaired fasting glucose versus type 2 diabetes, blood sugars elevating due to steroids, discussed with patient about going home on medications for this with her oral steroid need at discharge as above with Metformin being sent home as a new medication for her,     Previous cholecystectomy, appendectomy and C-sections     Hospital stay February, discharged February 9, 2018 for seizure-like activity with mental status changes, developed respiratory failure, bibasilar atelectasis pneumonia was treated in the hospital with IV antibiotics steroids for wheezing breathing treatments, outside CT scan [facility] showed no PE, But bibasilar atelectasis consolidations, February 2018 was started on Vimpat by neurology, propranolol was changed to  metoprolol due to the wheezing and respiratory issues, patient had MRI of the brain was negative for any significant findings, she had severe back pain neck pain posterior in the hospital that was treated with Toradol, no narcotics  OCCIPITAL NEURALGIA, FEB 2018, --WILL CHANGE TO INDERAL 40 MG BID   Morbid Obesity - s/p gastric sleeve-November 2016,--patient's weight was 289 October 31, 2016 currently at 229 February 14, 2017 total of 60 pounds,    INCREASING LFTS, W/U 5/2015, ALL NEG, ALL NL MAY 2017,   SYNCOPE WITH POSSIBLE SZ / ? ANXIETY 11/25/15. WENT TO ER.--  ANXIETY, PANIC ATTACKS , RX DISCUSSED RISK OF ADDICTION WITH [facility]   HEMATURIA, W/U WITH U/S BLADDER--FOR UROLOGY , DID SEE UROLOGY MICHAEL   PCOS, FOR KIRILL SOON ?  Follow Up   Return in about 4 months (around 3/8/2022).  Patient was given instructions and counseling regarding her condition or for health maintenance advice. Please see specific information pulled into the AVS if appropriate.

## 2021-11-01 NOTE — ED PROVIDER NOTES
Time: 1:56 PM EDT  Arrived by: ambulance  Chief Complaint: shortness of breath/cough  History provided by: patient  History is limited by: N/A     History of Present Illness:  Patient is a 41 y.o. year old female that presents to the emergency department with shortness of breath and cough that started 3 days ago. Pt thought she caught a cold from her grandchild so she tried cough medicine with minor relief. Pt has noticed wheezing. Pt has had a headache for a few months and has an appointment scheduled for that.     Pt has a history of asthma.   Pt is not on home oxygen.   Pt is vaccinated for COVID-19 and got the booster.       Shortness of Breath  Severity:  Mild  Onset quality:  Gradual  Duration:  3 days  Timing:  Constant  Progression:  Worsening  Chronicity:  New  Relieved by:  Nothing  Worsened by:  Nothing  Associated symptoms: cough and wheezing    Associated symptoms: no chest pain, no fever, no neck pain, no rash and no vomiting        Similar Symptoms Previously: no  Recently seen: yes      Patient Care Team  Primary Care Provider: Simone Sarabia MD    Past Medical History:     No Known Allergies  Past Medical History:   Diagnosis Date   • Abnormal levels of other serum enzymes    • Asthma    • Essential (primary) hypertension    • GERD without esophagitis    • Other abnormal glucose    • PCOS (polycystic ovarian syndrome)    • Polyarthritis, unspecified    • Pure hypercholesterolemia    • Vitamin D deficiency      Past Surgical History:   Procedure Laterality Date   • APPENDECTOMY     •  SECTION     • CHOLECYSTECTOMY     • GASTRIC SLEEVE LAPAROSCOPIC     • HYSTERECTOMY  2015     Family History   Problem Relation Age of Onset   • Hypertension Mother    • Breast cancer Mother    • Diabetes type II Mother    • Cancer Father    • Hypertension Sister        Home Medications:  Prior to Admission medications    Medication Sig Start Date End Date Taking? Authorizing Provider   albuterol  "sulfate  (90 Base) MCG/ACT inhaler Inhale 2 puffs Every 4 (Four) Hours As Needed for Wheezing.    Emergency, Nurse Ngoc, RN   escitalopram (LEXAPRO) 10 MG tablet Take 1 tablet by mouth once daily for 30 days 10/25/21   Simone Sarabia MD   montelukast (SINGULAIR) 10 MG tablet Take 1 tablet by mouth Every Night. 9/12/21   Simone Sarabia MD        Social History:   Social History     Tobacco Use   • Smoking status: Current Every Day Smoker     Packs/day: 0.25     Years: 30.00     Pack years: 7.50     Types: Cigarettes   • Smokeless tobacco: Never Used   Vaping Use   • Vaping Use: Never used   Substance Use Topics   • Alcohol use: Yes     Comment: socially   • Drug use: Never     Recent travel: not applicable     Review of Systems:  Review of Systems   Constitutional: Negative for chills and fever.   HENT: Negative for nosebleeds.    Eyes: Negative for redness.   Respiratory: Positive for cough, shortness of breath and wheezing.    Cardiovascular: Negative for chest pain.   Gastrointestinal: Negative for diarrhea and vomiting.   Genitourinary: Negative for dysuria and frequency.   Musculoskeletal: Negative for back pain and neck pain.   Skin: Negative for rash.   Neurological: Negative for seizures and syncope.        Physical Exam:  /59 (BP Location: Left arm, Patient Position: Lying)   Pulse 77   Temp 97.7 °F (36.5 °C) (Oral)   Resp 16   Ht 167.6 cm (66\")   Wt 117 kg (258 lb 6.1 oz)   SpO2 93%   BMI 41.70 kg/m²     Physical Exam  Vitals and nursing note reviewed.   Constitutional:       General: She is not in acute distress.     Appearance: Normal appearance. She is ill-appearing. She is not toxic-appearing.   HENT:      Head: Normocephalic and atraumatic.      Nose: Nose normal.      Mouth/Throat:      Mouth: Mucous membranes are moist.   Eyes:      General: Lids are normal. No scleral icterus.     Conjunctiva/sclera: Conjunctivae normal.   Cardiovascular:      Rate and Rhythm: " Normal rate and regular rhythm.      Pulses: Normal pulses.      Heart sounds: Normal heart sounds. No murmur heard.      Pulmonary:      Effort: Pulmonary effort is normal. No respiratory distress.      Breath sounds: Wheezing (moderate coarse) present.   Chest:      Chest wall: No tenderness.   Abdominal:      Palpations: Abdomen is soft.      Tenderness: There is no abdominal tenderness. There is no guarding or rebound.   Musculoskeletal:         General: No tenderness. Normal range of motion.      Cervical back: Normal range of motion and neck supple.      Right lower leg: Edema (trace lower leg) present.      Left lower leg: Edema (trace lower leg) present.   Skin:     General: Skin is warm and dry.      Findings: No rash.   Neurological:      Mental Status: She is alert and oriented to person, place, and time. Mental status is at baseline.      Sensory: Sensation is intact.      Motor: Motor function is intact.   Psychiatric:         Behavior: Behavior normal.                Medications in the Emergency Department:  Medications   sodium chloride 0.9 % flush 10 mL (has no administration in time range)   AZITHROMYCIN 500 MG/250 ML 0.9% NS IVPB (vial-mate) (500 mg Intravenous New Bag 11/4/21 0926)   cefTRIAXone (ROCEPHIN) IVPB 1 g (1 g Intravenous New Bag 11/4/21 0920)   methylPREDNISolone sodium succinate (SOLU-Medrol) injection 125 mg (125 mg Intravenous Given 11/4/21 0547)   ipratropium-albuterol (DUO-NEB) nebulizer solution 3 mL (3 mL Nebulization Given 11/4/21 1202)   famotidine (PEPCID) injection 20 mg (20 mg Intravenous Given 11/4/21 0919)   escitalopram (LEXAPRO) tablet 10 mg (10 mg Oral Given 11/4/21 0920)   nitroglycerin (NITROSTAT) SL tablet 0.4 mg (has no administration in time range)   sodium chloride 0.9 % flush 10 mL (10 mL Intravenous Given 11/4/21 1054)   sodium chloride 0.9 % flush 10 mL (has no administration in time range)   acetaminophen (TYLENOL) tablet 650 mg (650 mg Oral Given 11/2/21  2041)     Or   acetaminophen (TYLENOL) 160 MG/5ML solution 650 mg ( Oral Not Given:  See Alt 11/2/21 2041)     Or   acetaminophen (TYLENOL) suppository 650 mg ( Rectal Not Given:  See Alt 11/2/21 2041)   ondansetron (ZOFRAN) injection 4 mg (has no administration in time range)   guaiFENesin (MUCINEX) 12 hr tablet 600 mg (600 mg Oral Given 11/4/21 0920)   guaiFENesin-dextromethorphan (ROBITUSSIN DM) 100-10 MG/5ML syrup 10 mL (10 mL Oral Given 11/2/21 2041)   influenza vac split quad (FLUZONE,FLUARIX,AFLURIA,FLULAVAL) injection 0.5 mL (has no administration in time range)   montelukast (SINGULAIR) tablet 10 mg (10 mg Oral Given 11/4/21 0920)   arformoterol (BROVANA) nebulizer solution 15 mcg (15 mcg Nebulization Given 11/4/21 0623)   LORazepam (ATIVAN) tablet 0.5 mg (0.5 mg Oral Given 11/3/21 1853)   enoxaparin (LOVENOX) syringe 40 mg (40 mg Subcutaneous Given 11/3/21 2230)   dextrose (GLUTOSE) oral gel 15 g (has no administration in time range)   dextrose 10 % infusion (has no administration in time range)   glucagon (human recombinant) (GLUCAGEN DIAGNOSTIC) injection 1 mg (has no administration in time range)   ketorolac (TORADOL) injection 15 mg (15 mg Intravenous Given 11/4/21 0553)   amitriptyline (ELAVIL) tablet 10 mg (10 mg Oral Given 11/3/21 2128)   HYDROcodone-acetaminophen (NORCO) 5-325 MG per tablet 1 tablet (1 tablet Oral Given 11/4/21 0926)   insulin lispro (humaLOG) injection 0-9 Units (6 Units Subcutaneous Given 11/4/21 1243)   insulin detemir (LEVEMIR) injection 20 Units (20 Units Subcutaneous Given 11/3/21 1851)   methylPREDNISolone sodium succinate (SOLU-Medrol) injection 125 mg (125 mg Intravenous Given 11/1/21 1452)   ipratropium-albuterol (DUO-NEB) nebulizer solution 3 mL (3 mL Nebulization Given 11/1/21 1423)   ipratropium-albuterol (DUO-NEB) nebulizer solution 3 mL (3 mL Nebulization Given 11/1/21 1423)   ketorolac (TORADOL) injection 30 mg (30 mg Intravenous Given 11/1/21 1610)   HYDROmorphone  (DILAUDID) injection 1 mg (1 mg Intravenous Given 11/1/21 1953)   ipratropium-albuterol (DUO-NEB) nebulizer solution 3 mL (3 mL Nebulization Given 11/1/21 2000)   ipratropium-albuterol (DUO-NEB) nebulizer solution 3 mL (3 mL Nebulization Given 11/1/21 1955)   cefTRIAXone (ROCEPHIN) IVPB 1 g (0 g Intravenous Stopped 11/1/21 2015)   AZITHROMYCIN 500 MG/250 ML 0.9% NS IVPB (vial-mate) (500 mg Intravenous New Bag 11/1/21 2015)        Labs  Lab Results (last 24 hours)     Procedure Component Value Units Date/Time    POC Glucose Once [[id number]]  (Abnormal) Collected: 11/03/21 1746    Specimen: Blood Updated: 11/03/21 1747     Glucose 302 mg/dL      Comment: Serial Number: [id number]:  [id number]       Urinalysis With Culture If Indicated - Urine, Random Void [[id number]]  (Abnormal) Collected: 11/03/21 1959    Specimen: Urine, Random Void Updated: 11/03/21 2026     Color, UA Yellow     Appearance, UA Clear     pH, UA 6.5     Specific Gravity, UA >1.030     Glucose, UA >=1000 mg/dL (3+)     Ketones, UA Negative     Bilirubin, UA Negative     Blood, UA Moderate (2+)     Protein, UA Trace     Leuk Esterase, UA Negative     Nitrite, UA Negative     Urobilinogen, UA 1.0 E.U./dL    Urinalysis, Microscopic Only - Urine, Random Void [[id number]]  (Abnormal) Collected: 11/03/21 1959    Specimen: Urine, Random Void Updated: 11/03/21 2026     RBC, UA Too Numerous to Count /HPF      WBC, UA 0-2 /HPF      Bacteria, UA None Seen /HPF      Squamous Epithelial Cells, UA 0-2 /HPF      Hyaline Casts, UA None Seen /LPF      Methodology Automated Microscopy    TSH Rfx On Abnormal To Free T4 [[id number]]  (Abnormal) Collected: 11/04/21 0544    Specimen: Blood Updated: 11/04/21 0711     TSH 0.056 uIU/mL     Vitamin B12 [[id number]]  (Normal) Collected: 11/04/21 0544    Specimen: Blood Updated: 11/04/21 1035     Vitamin B-12 444 pg/mL     Narrative:      Results may be falsely increased if patient taking Biotin.      Vitamin D 25 Hydroxy  [[id number]]  (Abnormal) Collected: 11/04/21 0544    Specimen: Blood Updated: 11/04/21 1035     25 Hydroxy, Vitamin D 19.3 ng/ml     Narrative:      Reference Range for Total Vitamin D 25(OH)     Deficiency <20.0 ng/mL   Insufficiency 21-29 ng/mL   Sufficiency  ng/mL  Toxicity >100 ng/ml    Results may be falsely increased if patient taking Biotin.      Folate [[id number]]  (Abnormal) Collected: 11/04/21 0544    Specimen: Blood Updated: 11/04/21 1035     Folate 2.64 ng/mL     Narrative:      Results may be falsely increased if patient taking Biotin.      Basic Metabolic Panel [[id number]]  (Abnormal) Collected: 11/04/21 0544    Specimen: Blood Updated: 11/04/21 0706     Glucose 152 mg/dL      BUN 20 mg/dL      Creatinine 0.54 mg/dL      Sodium 135 mmol/L      Potassium 4.6 mmol/L      Chloride 103 mmol/L      CO2 25.1 mmol/L      Calcium 8.6 mg/dL      eGFR Non African Amer 123 mL/min/1.73      BUN/Creatinine Ratio 37.0     Anion Gap 6.9 mmol/L     Narrative:      GFR Normal >60  Chronic Kidney Disease <60  Kidney Failure <15      CBC & Differential [[id number]]  (Abnormal) Collected: 11/04/21 0544    Specimen: Blood Updated: 11/04/21 0646    Narrative:      The following orders were created for panel order CBC & Differential.  Procedure                               Abnormality         Status                     ---------                               -----------         ------                     CBC Auto Differential[[id number]]        Abnormal            Final result                 Please view results for these tests on the individual orders.    CBC Auto Differential [[id number]]  (Abnormal) Collected: 11/04/21 0544    Specimen: Blood Updated: 11/04/21 0646     WBC 15.39 10*3/mm3      RBC 4.57 10*6/mm3      Hemoglobin 13.5 g/dL      Hematocrit 41.1 %      MCV 89.9 fL      MCH 29.5 pg      MCHC 32.8 g/dL      RDW 14.1 %      RDW-SD 46.0 fl      MPV 11.4 fL      Platelets 188 10*3/mm3      Neutrophil % 82.1 %       Lymphocyte % 13.8 %      Monocyte % 3.2 %      Eosinophil % 0.0 %      Basophil % 0.1 %      Immature Grans % 0.8 %      Neutrophils, Absolute 12.64 10*3/mm3      Lymphocytes, Absolute 2.12 10*3/mm3      Monocytes, Absolute 0.49 10*3/mm3      Eosinophils, Absolute 0.00 10*3/mm3      Basophils, Absolute 0.01 10*3/mm3      Immature Grans, Absolute 0.13 10*3/mm3      nRBC 0.0 /100 WBC     T4, Free [[id number]]  (Abnormal) Collected: 11/04/21 0544    Specimen: Blood Updated: 11/04/21 0732     Free T4 0.89 ng/dL     Narrative:      Results may be falsely increased if patient taking Biotin.      POC Glucose Once [[id number]]  (Abnormal) Collected: 11/04/21 0749    Specimen: Blood Updated: 11/04/21 0752     Glucose 170 mg/dL      Comment: Serial Number: [id number]:  [id number]       POC Glucose Once [[id number]]  (Abnormal) Collected: 11/04/21 1108    Specimen: Blood Updated: 11/04/21 1147     Glucose 271 mg/dL      Comment: Serial Number: [id number]:  [id number]              Imaging:  US Renal Bilateral    Result Date: 11/4/2021  PROCEDURE: US RENAL BILATERAL  COMPARISON: None  INDICATIONS: Hematuria, abdominal pain  FINDINGS:  The right kidney measures 11.9 x 6.3 x 6.5 cm.  The left kidney measures 12.3 x 6.6 x 6.3 cm.  The kidneys are symmetric in size.  There is no hydronephrosis.  There are no shadowing renal stones.  There are no solid or cystic renal lesions.  The urinary bladder is underdistended with bladder volume of 32 cc.  CONCLUSION:  1. Normal sized kidneys without hydronephrosis. 2. No sonographically visualized renal stones.       MYLES HERNANDEZ MD       Electronically Signed and Approved By: MYLES HERNANDEZ MD on 11/04/2021 at 12:20               Procedures:  Procedures    Progress                      The patient was seen evaluated ED by me.  The above history and physical examination was performed.  Due to the wheezing patient was started on IV Solu-Medrol given multiple DuoNeb treatments.   Patient did have improvement of her aeration and decreased wheezing after the breathing treatments but after period of time the wheezing came back.  Patient is also complained of a persistent headache and was given Toradol followed by Dilaudid for this.  Patient is x-ray was consistent with pneumonia.  Patient states that she has had both of her Covid vaccines plus a booster as well as tested herself at work on Thursday and was negative.  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  Patient started community-acquired antibiotics and have consult with her PCP for admission.  Patient's oxygen level without O2 was 88 % after only a couple minutes.      Medical Decision Making:  [facility]     Final diagnoses:   Pneumonia of both lower lobes due to infectious organism        Disposition:  ED Disposition     ED Disposition Condition Comment    Decision to Admit  Level of Care: Remote Telemetry [26]   Diagnosis: Pneumonia [097686]   Admitting Physician: JARED SARAVIA [[id number]]   Isolate for COVID?: Yes [1]   Certification: I Certify That Inpatient Hospital Services Are Medically Necessary For Greater Than 2 Midnights            This medical record created using voice recognition software and may contain unintended errors.    Documentation assistance provided by Helen Herbert acting as scribe for Trae Fried DO. Information recorded by the scribe was done at my direction and has been verified and validated by me.          Helen Herbert  11/01/21 1401       Trae Fried DO  11/04/21 1024       Trae Fried DO  11/04/21 1321

## 2021-11-02 LAB
ANION GAP SERPL CALCULATED.3IONS-SCNC: 14 MMOL/L (ref 5–15)
BASOPHILS # BLD AUTO: 0.02 10*3/MM3 (ref 0–0.2)
BASOPHILS NFR BLD AUTO: 0.1 % (ref 0–1.5)
BUN SERPL-MCNC: 17 MG/DL (ref 6–20)
BUN/CREAT SERPL: 25.8 (ref 7–25)
CALCIUM SPEC-SCNC: 9.3 MG/DL (ref 8.6–10.5)
CHLORIDE SERPL-SCNC: 105 MMOL/L (ref 98–107)
CO2 SERPL-SCNC: 20 MMOL/L (ref 22–29)
CREAT SERPL-MCNC: 0.66 MG/DL (ref 0.57–1)
DEPRECATED RDW RBC AUTO: 46.3 FL (ref 37–54)
EOSINOPHIL # BLD AUTO: 0 10*3/MM3 (ref 0–0.4)
EOSINOPHIL NFR BLD AUTO: 0 % (ref 0.3–6.2)
ERYTHROCYTE [DISTWIDTH] IN BLOOD BY AUTOMATED COUNT: 14 % (ref 12.3–15.4)
GFR SERPL CREATININE-BSD FRML MDRD: 98 ML/MIN/1.73
GLUCOSE BLDC GLUCOMTR-MCNC: 160 MG/DL (ref 70–99)
GLUCOSE BLDC GLUCOMTR-MCNC: 175 MG/DL (ref 70–99)
GLUCOSE BLDC GLUCOMTR-MCNC: 188 MG/DL (ref 70–99)
GLUCOSE SERPL-MCNC: 162 MG/DL (ref 65–99)
HCT VFR BLD AUTO: 45.1 % (ref 34–46.6)
HGB BLD-MCNC: 14.8 G/DL (ref 12–15.9)
IMM GRANULOCYTES # BLD AUTO: 0.06 10*3/MM3 (ref 0–0.05)
IMM GRANULOCYTES NFR BLD AUTO: 0.4 % (ref 0–0.5)
L PNEUMO1 AG UR QL IA: NEGATIVE
LYMPHOCYTES # BLD AUTO: 1.66 10*3/MM3 (ref 0.7–3.1)
LYMPHOCYTES NFR BLD AUTO: 10.9 % (ref 19.6–45.3)
MAGNESIUM SERPL-MCNC: 2.2 MG/DL (ref 1.6–2.6)
MCH RBC QN AUTO: 29.5 PG (ref 26.6–33)
MCHC RBC AUTO-ENTMCNC: 32.8 G/DL (ref 31.5–35.7)
MCV RBC AUTO: 90 FL (ref 79–97)
MONOCYTES # BLD AUTO: 0.39 10*3/MM3 (ref 0.1–0.9)
MONOCYTES NFR BLD AUTO: 2.6 % (ref 5–12)
NEUTROPHILS NFR BLD AUTO: 13.09 10*3/MM3 (ref 1.7–7)
NEUTROPHILS NFR BLD AUTO: 86 % (ref 42.7–76)
NRBC BLD AUTO-RTO: 0 /100 WBC (ref 0–0.2)
PLATELET # BLD AUTO: 202 10*3/MM3 (ref 140–450)
PMV BLD AUTO: 11.5 FL (ref 6–12)
POTASSIUM SERPL-SCNC: 4.6 MMOL/L (ref 3.5–5.2)
QT INTERVAL: 366 MS
RBC # BLD AUTO: 5.01 10*6/MM3 (ref 3.77–5.28)
S PNEUM AG SPEC QL LA: NEGATIVE
SODIUM SERPL-SCNC: 139 MMOL/L (ref 136–145)
WBC # BLD AUTO: 15.22 10*3/MM3 (ref 3.4–10.8)

## 2021-11-02 PROCEDURE — 94760 N-INVAS EAR/PLS OXIMETRY 1: CPT

## 2021-11-02 PROCEDURE — 80048 BASIC METABOLIC PNL TOTAL CA: CPT | Performed by: INTERNAL MEDICINE

## 2021-11-02 PROCEDURE — 82962 GLUCOSE BLOOD TEST: CPT

## 2021-11-02 PROCEDURE — 25010000002 ENOXAPARIN PER 10 MG: Performed by: INTERNAL MEDICINE

## 2021-11-02 PROCEDURE — 85025 COMPLETE CBC W/AUTO DIFF WBC: CPT | Performed by: INTERNAL MEDICINE

## 2021-11-02 PROCEDURE — 25010000002 CEFTRIAXONE PER 250 MG: Performed by: INTERNAL MEDICINE

## 2021-11-02 PROCEDURE — 99223 1ST HOSP IP/OBS HIGH 75: CPT | Performed by: INTERNAL MEDICINE

## 2021-11-02 PROCEDURE — 94799 UNLISTED PULMONARY SVC/PX: CPT

## 2021-11-02 PROCEDURE — 83735 ASSAY OF MAGNESIUM: CPT | Performed by: INTERNAL MEDICINE

## 2021-11-02 PROCEDURE — 36415 COLL VENOUS BLD VENIPUNCTURE: CPT | Performed by: INTERNAL MEDICINE

## 2021-11-02 PROCEDURE — 25010000002 AZITHROMYCIN PER 500 MG: Performed by: INTERNAL MEDICINE

## 2021-11-02 PROCEDURE — 25010000002 METHYLPREDNISOLONE PER 125 MG: Performed by: INTERNAL MEDICINE

## 2021-11-02 RX ORDER — NICOTINE POLACRILEX 4 MG
15 LOZENGE BUCCAL
Status: DISCONTINUED | OUTPATIENT
Start: 2021-11-02 | End: 2021-11-04 | Stop reason: HOSPADM

## 2021-11-02 RX ORDER — LORAZEPAM 0.5 MG/1
0.5 TABLET ORAL EVERY 6 HOURS PRN
Status: DISCONTINUED | OUTPATIENT
Start: 2021-11-02 | End: 2021-11-04 | Stop reason: HOSPADM

## 2021-11-02 RX ORDER — ARFORMOTEROL TARTRATE 15 UG/2ML
15 SOLUTION RESPIRATORY (INHALATION)
Status: DISCONTINUED | OUTPATIENT
Start: 2021-11-02 | End: 2021-11-04 | Stop reason: HOSPADM

## 2021-11-02 RX ORDER — DEXTROSE MONOHYDRATE 100 MG/ML
25 INJECTION, SOLUTION INTRAVENOUS
Status: DISCONTINUED | OUTPATIENT
Start: 2021-11-02 | End: 2021-11-04 | Stop reason: HOSPADM

## 2021-11-02 RX ADMIN — IPRATROPIUM BROMIDE AND ALBUTEROL SULFATE 3 ML: .5; 2.5 SOLUTION RESPIRATORY (INHALATION) at 12:54

## 2021-11-02 RX ADMIN — ENOXAPARIN SODIUM 40 MG: 40 INJECTION SUBCUTANEOUS at 22:24

## 2021-11-02 RX ADMIN — AZITHROMYCIN 500 MG: 500 INJECTION, POWDER, LYOPHILIZED, FOR SOLUTION INTRAVENOUS at 10:08

## 2021-11-02 RX ADMIN — ACETAMINOPHEN 650 MG: 325 TABLET ORAL at 09:07

## 2021-11-02 RX ADMIN — GUAIFENESIN 600 MG: 600 TABLET ORAL at 20:42

## 2021-11-02 RX ADMIN — METHYLPREDNISOLONE SODIUM SUCCINATE 125 MG: 125 INJECTION, POWDER, FOR SOLUTION INTRAMUSCULAR; INTRAVENOUS at 14:52

## 2021-11-02 RX ADMIN — ACETAMINOPHEN 650 MG: 325 TABLET ORAL at 15:23

## 2021-11-02 RX ADMIN — METHYLPREDNISOLONE SODIUM SUCCINATE 125 MG: 125 INJECTION, POWDER, FOR SOLUTION INTRAMUSCULAR; INTRAVENOUS at 22:24

## 2021-11-02 RX ADMIN — GUAIFENESIN 600 MG: 600 TABLET ORAL at 09:07

## 2021-11-02 RX ADMIN — IPRATROPIUM BROMIDE AND ALBUTEROL SULFATE 3 ML: .5; 2.5 SOLUTION RESPIRATORY (INHALATION) at 19:17

## 2021-11-02 RX ADMIN — FAMOTIDINE 20 MG: 10 INJECTION INTRAVENOUS at 20:22

## 2021-11-02 RX ADMIN — METHYLPREDNISOLONE SODIUM SUCCINATE 125 MG: 125 INJECTION, POWDER, FOR SOLUTION INTRAMUSCULAR; INTRAVENOUS at 06:08

## 2021-11-02 RX ADMIN — CEFTRIAXONE SODIUM 1 G: 1 INJECTION, SOLUTION INTRAVENOUS at 09:07

## 2021-11-02 RX ADMIN — ESCITALOPRAM OXALATE 10 MG: 10 TABLET ORAL at 09:06

## 2021-11-02 RX ADMIN — ARFORMOTEROL TARTRATE 15 MCG: 15 SOLUTION RESPIRATORY (INHALATION) at 12:54

## 2021-11-02 RX ADMIN — IPRATROPIUM BROMIDE AND ALBUTEROL SULFATE 3 ML: .5; 2.5 SOLUTION RESPIRATORY (INHALATION) at 06:35

## 2021-11-02 RX ADMIN — GUAIFENESIN AND DEXTROMETHORPHAN 10 ML: 100; 10 SYRUP ORAL at 20:41

## 2021-11-02 RX ADMIN — SODIUM CHLORIDE, PRESERVATIVE FREE 10 ML: 5 INJECTION INTRAVENOUS at 09:07

## 2021-11-02 RX ADMIN — SODIUM CHLORIDE, PRESERVATIVE FREE 10 ML: 5 INJECTION INTRAVENOUS at 20:22

## 2021-11-02 RX ADMIN — ACETAMINOPHEN 650 MG: 325 TABLET ORAL at 20:41

## 2021-11-02 RX ADMIN — MONTELUKAST 10 MG: 10 TABLET, FILM COATED ORAL at 09:06

## 2021-11-02 RX ADMIN — FAMOTIDINE 20 MG: 10 INJECTION INTRAVENOUS at 09:07

## 2021-11-02 RX ADMIN — LORAZEPAM 0.5 MG: 0.5 TABLET ORAL at 20:41

## 2021-11-02 RX ADMIN — ARFORMOTEROL TARTRATE 15 MCG: 15 SOLUTION RESPIRATORY (INHALATION) at 19:18

## 2021-11-02 NOTE — PLAN OF CARE
Goal Outcome Evaluation:              Outcome Summary: pt a&ox4, new admit tonight. works night shift, awake some of the night. 6L/NC, with continuous pulse ox. flex telemetry. SOA with exertion. robitussin x1 prn tonight. no other complaints at this time. spoke with dr. rodney this morning and he is to put some orders in.

## 2021-11-02 NOTE — H&P
[facility]   HISTORY AND PHYSICAL    Patient Name: Ophelia Velazco  : 1980  MRN: [id number]  Primary Care Physician: Provider, No Known  Date of admission: 2021      Subjective   Subjective     Chief Complaint/HPI: Shortness of breath, wheezing, cough    Ophelia Velazco is a 41 y.o. female presents to the emergency room with a several day history of coughing shortness of breath wheezing, not able to get her breath, she says she still smokes, she was at acute care several days ago to a week ago according to nursing staff,?  And had testing done at that time, her current Covid test is negative as of early this morning per nursing staff, patient says she is very anxious and was wanting some Ativan, says she is been having a lot more headaches recently, she sees our nurse practitioner at the office on a fairly regular basis,    Review of Systems   All systems were reviewed and negative except for: Short of breath, wheezing anxious headaches, no fevers no nausea or vomiting      Personal History     Past Medical History:   Diagnosis Date   • Abnormal levels of other serum enzymes    • Asthma    • Essential (primary) hypertension    • GERD without esophagitis    • Other abnormal glucose    • PCOS (polycystic ovarian syndrome)    • Polyarthritis, unspecified    • Pure hypercholesterolemia    • Vitamin D deficiency    Tobacco abuse  Anxiety disorder  Obesity    Past Surgical History:   Procedure Laterality Date   • APPENDECTOMY     •  SECTION     • CHOLECYSTECTOMY     • GASTRIC SLEEVE LAPAROSCOPIC     • HYSTERECTOMY         Family History: family history includes Breast cancer in her mother; Cancer in her father; Diabetes type II in her mother; Hypertension in her mother and sister. Otherwise pertinent FHx was reviewed and not pertinent to current issue.    Social History:  reports that she has been smoking cigarettes. She has a 7.50 pack-year smoking history. She has never used  smokeless tobacco. She reports current alcohol use. She reports that she does not use drugs.    Home Medications:  albuterol sulfate HFA, escitalopram, and montelukast      Allergies:  No Known Allergies    Objective   Objective     Vitals:  Temp:  [97.8 °F (36.6 °C)-98.9 °F (37.2 °C)] 98.3 °F (36.8 °C)  Heart Rate:  [] 92  Resp:  [18-24] 18  BP: (118-172)/(57-95) 126/78  Flow (L/min):  [2-6] 6    Physical Exam   She is awake alert and oriented x3 she has no rashes on her skin of her face arms or lower legs, her lower legs showed no pitting edema 2+ DP pulses her abdomen is obese soft nontender her lungs reveal diffuse fine expiratory wheezes both anterior and laterally and posteriorly, cardiac exam regular rhythm her neck is supple without adenopathy her throat is clear with dry skin mucosa and again sclera nonicteric, she is alert and oriented x3 moving all 4 extremities to command    Result Review    Result Review:  I have personally reviewed the results from the time of this admission to 11/02/21 6:44 AM EDT and agree with these findings:  [x]  Laboratory  [x]  Microbiology  [x]  Radiology  [x]  EKG/Telemetry   []  Cardiology/Vascular   []  Pathology  [x]  Old records  []  Other:      Assessment/Plan   Assessment / Plan     Brief Patient Summary:  Ophelia Velazco is a 41 y.o. female who presents with acute COPD and asthma exacerbation, wheezing and shortness of breath for the past several days, and increased headaches  Active Hospital Problems:  Active Hospital Problems    Diagnosis    • Pneumonia        Assessment/Plan:     Bilateral basilar pneumonia, previously vaccinated for Covid, with positive booster dosing recently    Asthma exacerbation/COPD exacerbation with tobacco abuse    Headaches chronic recurrent    Hypertension    Anxiety depression currently on Lexapro    Gastroesophageal reflux disease    Polycystic ovary syndrome    Vitamin D deficiency    Mixed hyperlipidemia    Obesity    Tobacco  abuse    Impaired fasting glucose versus type 2 diabetes    Previous cholecystectomy, appendectomy and C-sections      Plan----------- continue current breathing treatments, IV steroids with insulin monitoring and possible sliding scale coverage, will add [place] to her medical regimen continue Singulair, her current Legionella and urine testing has been negative her mycoplasma IgM was negative, current Covid swab is also negative, will continue supportive care,  DVT prophylaxis will continue with Lovenox, GI prophylaxis with IV Pepcid, daily labs  Consider smoking cessation program nicotine patch etc.    Patient is considered seriously ill,       CODE STATUS:    Code Status and Medical Interventions:   Ordered at: 11/01/21 2040     Level Of Support Discussed With:    Patient     Code Status:    CPR     Medical Interventions (Level of Support Prior to Arrest):    Full         Electronically signed by Simone Sarabia MD, 11/02/21, 6:44 AM EDT.

## 2021-11-03 LAB
ANION GAP SERPL CALCULATED.3IONS-SCNC: 7 MMOL/L (ref 5–15)
BACTERIA UR QL AUTO: ABNORMAL /HPF
BASOPHILS # BLD AUTO: 0.03 10*3/MM3 (ref 0–0.2)
BASOPHILS NFR BLD AUTO: 0.1 % (ref 0–1.5)
BILIRUB UR QL STRIP: NEGATIVE
BUN SERPL-MCNC: 16 MG/DL (ref 6–20)
BUN/CREAT SERPL: 32.7 (ref 7–25)
CALCIUM SPEC-SCNC: 8.9 MG/DL (ref 8.6–10.5)
CHLORIDE SERPL-SCNC: 105 MMOL/L (ref 98–107)
CLARITY UR: CLEAR
CO2 SERPL-SCNC: 25 MMOL/L (ref 22–29)
COLOR UR: YELLOW
CREAT SERPL-MCNC: 0.49 MG/DL (ref 0.57–1)
DEPRECATED RDW RBC AUTO: 47.3 FL (ref 37–54)
EOSINOPHIL # BLD AUTO: 0 10*3/MM3 (ref 0–0.4)
EOSINOPHIL NFR BLD AUTO: 0 % (ref 0.3–6.2)
ERYTHROCYTE [DISTWIDTH] IN BLOOD BY AUTOMATED COUNT: 14.1 % (ref 12.3–15.4)
GFR SERPL CREATININE-BSD FRML MDRD: 138 ML/MIN/1.73
GLUCOSE BLDC GLUCOMTR-MCNC: 156 MG/DL (ref 70–99)
GLUCOSE BLDC GLUCOMTR-MCNC: 195 MG/DL (ref 70–99)
GLUCOSE BLDC GLUCOMTR-MCNC: 302 MG/DL (ref 70–99)
GLUCOSE SERPL-MCNC: 167 MG/DL (ref 65–99)
GLUCOSE UR STRIP-MCNC: ABNORMAL MG/DL
HCT VFR BLD AUTO: 43.2 % (ref 34–46.6)
HGB BLD-MCNC: 14.1 G/DL (ref 12–15.9)
HGB UR QL STRIP.AUTO: ABNORMAL
HYALINE CASTS UR QL AUTO: ABNORMAL /LPF
IMM GRANULOCYTES # BLD AUTO: 0.14 10*3/MM3 (ref 0–0.05)
IMM GRANULOCYTES NFR BLD AUTO: 0.7 % (ref 0–0.5)
KETONES UR QL STRIP: NEGATIVE
LEUKOCYTE ESTERASE UR QL STRIP.AUTO: NEGATIVE
LYMPHOCYTES # BLD AUTO: 1.73 10*3/MM3 (ref 0.7–3.1)
LYMPHOCYTES NFR BLD AUTO: 8.6 % (ref 19.6–45.3)
MAGNESIUM SERPL-MCNC: 2.3 MG/DL (ref 1.6–2.6)
MCH RBC QN AUTO: 29.5 PG (ref 26.6–33)
MCHC RBC AUTO-ENTMCNC: 32.6 G/DL (ref 31.5–35.7)
MCV RBC AUTO: 90.4 FL (ref 79–97)
MONOCYTES # BLD AUTO: 0.61 10*3/MM3 (ref 0.1–0.9)
MONOCYTES NFR BLD AUTO: 3 % (ref 5–12)
NEUTROPHILS NFR BLD AUTO: 17.51 10*3/MM3 (ref 1.7–7)
NEUTROPHILS NFR BLD AUTO: 87.6 % (ref 42.7–76)
NITRITE UR QL STRIP: NEGATIVE
NRBC BLD AUTO-RTO: 0 /100 WBC (ref 0–0.2)
PH UR STRIP.AUTO: 6.5 [PH] (ref 5–8)
PLATELET # BLD AUTO: 191 10*3/MM3 (ref 140–450)
PMV BLD AUTO: 11.6 FL (ref 6–12)
POTASSIUM SERPL-SCNC: 4.6 MMOL/L (ref 3.5–5.2)
PROT UR QL STRIP: ABNORMAL
RBC # BLD AUTO: 4.78 10*6/MM3 (ref 3.77–5.28)
RBC # UR: ABNORMAL /HPF
REF LAB TEST METHOD: ABNORMAL
SODIUM SERPL-SCNC: 137 MMOL/L (ref 136–145)
SP GR UR STRIP: >1.03 (ref 1–1.03)
SQUAMOUS #/AREA URNS HPF: ABNORMAL /HPF
UROBILINOGEN UR QL STRIP: ABNORMAL
WBC # BLD AUTO: 20.02 10*3/MM3 (ref 3.4–10.8)
WBC UR QL AUTO: ABNORMAL /HPF

## 2021-11-03 PROCEDURE — 82962 GLUCOSE BLOOD TEST: CPT

## 2021-11-03 PROCEDURE — 94760 N-INVAS EAR/PLS OXIMETRY 1: CPT

## 2021-11-03 PROCEDURE — 25010000002 CEFTRIAXONE PER 250 MG: Performed by: INTERNAL MEDICINE

## 2021-11-03 PROCEDURE — 99232 SBSQ HOSP IP/OBS MODERATE 35: CPT | Performed by: INTERNAL MEDICINE

## 2021-11-03 PROCEDURE — 85025 COMPLETE CBC W/AUTO DIFF WBC: CPT | Performed by: INTERNAL MEDICINE

## 2021-11-03 PROCEDURE — 80048 BASIC METABOLIC PNL TOTAL CA: CPT | Performed by: INTERNAL MEDICINE

## 2021-11-03 PROCEDURE — 25010000002 KETOROLAC TROMETHAMINE PER 15 MG: Performed by: INTERNAL MEDICINE

## 2021-11-03 PROCEDURE — 81001 URINALYSIS AUTO W/SCOPE: CPT | Performed by: INTERNAL MEDICINE

## 2021-11-03 PROCEDURE — 25010000002 AZITHROMYCIN PER 500 MG: Performed by: INTERNAL MEDICINE

## 2021-11-03 PROCEDURE — 63710000001 INSULIN LISPRO (HUMAN) PER 5 UNITS: Performed by: INTERNAL MEDICINE

## 2021-11-03 PROCEDURE — 25010000002 METHYLPREDNISOLONE PER 125 MG: Performed by: INTERNAL MEDICINE

## 2021-11-03 PROCEDURE — 94799 UNLISTED PULMONARY SVC/PX: CPT

## 2021-11-03 PROCEDURE — 83735 ASSAY OF MAGNESIUM: CPT | Performed by: INTERNAL MEDICINE

## 2021-11-03 PROCEDURE — 25010000002 ENOXAPARIN PER 10 MG: Performed by: INTERNAL MEDICINE

## 2021-11-03 PROCEDURE — 63710000001 INSULIN DETEMIR PER 5 UNITS: Performed by: INTERNAL MEDICINE

## 2021-11-03 PROCEDURE — 36415 COLL VENOUS BLD VENIPUNCTURE: CPT | Performed by: INTERNAL MEDICINE

## 2021-11-03 RX ORDER — KETOROLAC TROMETHAMINE 15 MG/ML
15 INJECTION, SOLUTION INTRAMUSCULAR; INTRAVENOUS EVERY 6 HOURS PRN
Status: DISCONTINUED | OUTPATIENT
Start: 2021-11-03 | End: 2021-11-04 | Stop reason: HOSPADM

## 2021-11-03 RX ORDER — AMITRIPTYLINE HYDROCHLORIDE 10 MG/1
10 TABLET, FILM COATED ORAL NIGHTLY
Status: DISCONTINUED | OUTPATIENT
Start: 2021-11-03 | End: 2021-11-04 | Stop reason: HOSPADM

## 2021-11-03 RX ORDER — HYDROCODONE BITARTRATE AND ACETAMINOPHEN 5; 325 MG/1; MG/1
1 TABLET ORAL EVERY 6 HOURS PRN
Status: DISCONTINUED | OUTPATIENT
Start: 2021-11-03 | End: 2021-11-04 | Stop reason: HOSPADM

## 2021-11-03 RX ORDER — DEXTROSE MONOHYDRATE 100 MG/ML
25 INJECTION, SOLUTION INTRAVENOUS
Status: DISCONTINUED | OUTPATIENT
Start: 2021-11-03 | End: 2021-11-03 | Stop reason: SDUPTHER

## 2021-11-03 RX ORDER — NICOTINE POLACRILEX 4 MG
15 LOZENGE BUCCAL
Status: DISCONTINUED | OUTPATIENT
Start: 2021-11-03 | End: 2021-11-03 | Stop reason: SDUPTHER

## 2021-11-03 RX ADMIN — METHYLPREDNISOLONE SODIUM SUCCINATE 125 MG: 125 INJECTION, POWDER, FOR SOLUTION INTRAMUSCULAR; INTRAVENOUS at 05:36

## 2021-11-03 RX ADMIN — CEFTRIAXONE SODIUM 1 G: 1 INJECTION, SOLUTION INTRAVENOUS at 09:29

## 2021-11-03 RX ADMIN — IPRATROPIUM BROMIDE AND ALBUTEROL SULFATE 3 ML: .5; 2.5 SOLUTION RESPIRATORY (INHALATION) at 18:32

## 2021-11-03 RX ADMIN — ARFORMOTEROL TARTRATE 15 MCG: 15 SOLUTION RESPIRATORY (INHALATION) at 06:15

## 2021-11-03 RX ADMIN — FAMOTIDINE 20 MG: 10 INJECTION INTRAVENOUS at 21:27

## 2021-11-03 RX ADMIN — KETOROLAC TROMETHAMINE 15 MG: 15 INJECTION, SOLUTION INTRAMUSCULAR; INTRAVENOUS at 09:30

## 2021-11-03 RX ADMIN — AZITHROMYCIN 500 MG: 500 INJECTION, POWDER, LYOPHILIZED, FOR SOLUTION INTRAVENOUS at 09:30

## 2021-11-03 RX ADMIN — IPRATROPIUM BROMIDE AND ALBUTEROL SULFATE 3 ML: .5; 2.5 SOLUTION RESPIRATORY (INHALATION) at 00:38

## 2021-11-03 RX ADMIN — ENOXAPARIN SODIUM 40 MG: 40 INJECTION SUBCUTANEOUS at 22:30

## 2021-11-03 RX ADMIN — FAMOTIDINE 20 MG: 10 INJECTION INTRAVENOUS at 09:30

## 2021-11-03 RX ADMIN — MONTELUKAST 10 MG: 10 TABLET, FILM COATED ORAL at 09:30

## 2021-11-03 RX ADMIN — AMITRIPTYLINE HYDROCHLORIDE 10 MG: 10 TABLET, FILM COATED ORAL at 21:28

## 2021-11-03 RX ADMIN — KETOROLAC TROMETHAMINE 15 MG: 15 INJECTION, SOLUTION INTRAMUSCULAR; INTRAVENOUS at 21:27

## 2021-11-03 RX ADMIN — GUAIFENESIN 600 MG: 600 TABLET ORAL at 09:30

## 2021-11-03 RX ADMIN — INSULIN DETEMIR 20 UNITS: 100 INJECTION, SOLUTION SUBCUTANEOUS at 18:51

## 2021-11-03 RX ADMIN — GUAIFENESIN 600 MG: 600 TABLET ORAL at 21:28

## 2021-11-03 RX ADMIN — ESCITALOPRAM OXALATE 10 MG: 10 TABLET ORAL at 09:30

## 2021-11-03 RX ADMIN — HYDROCODONE BITARTRATE AND ACETAMINOPHEN 1 TABLET: 5; 325 TABLET ORAL at 11:12

## 2021-11-03 RX ADMIN — HYDROCODONE BITARTRATE AND ACETAMINOPHEN 1 TABLET: 5; 325 TABLET ORAL at 17:51

## 2021-11-03 RX ADMIN — SODIUM CHLORIDE, PRESERVATIVE FREE 10 ML: 5 INJECTION INTRAVENOUS at 09:30

## 2021-11-03 RX ADMIN — SODIUM CHLORIDE, PRESERVATIVE FREE 10 ML: 5 INJECTION INTRAVENOUS at 21:28

## 2021-11-03 RX ADMIN — ARFORMOTEROL TARTRATE 15 MCG: 15 SOLUTION RESPIRATORY (INHALATION) at 18:32

## 2021-11-03 RX ADMIN — IPRATROPIUM BROMIDE AND ALBUTEROL SULFATE 3 ML: .5; 2.5 SOLUTION RESPIRATORY (INHALATION) at 06:15

## 2021-11-03 RX ADMIN — LORAZEPAM 0.5 MG: 0.5 TABLET ORAL at 18:53

## 2021-11-03 RX ADMIN — METHYLPREDNISOLONE SODIUM SUCCINATE 125 MG: 125 INJECTION, POWDER, FOR SOLUTION INTRAMUSCULAR; INTRAVENOUS at 13:09

## 2021-11-03 RX ADMIN — METHYLPREDNISOLONE SODIUM SUCCINATE 125 MG: 125 INJECTION, POWDER, FOR SOLUTION INTRAMUSCULAR; INTRAVENOUS at 21:28

## 2021-11-03 RX ADMIN — INSULIN LISPRO 7 UNITS: 100 INJECTION, SOLUTION INTRAVENOUS; SUBCUTANEOUS at 18:50

## 2021-11-03 RX ADMIN — IPRATROPIUM BROMIDE AND ALBUTEROL SULFATE 3 ML: .5; 2.5 SOLUTION RESPIRATORY (INHALATION) at 11:58

## 2021-11-03 NOTE — PROGRESS NOTES
[facility]   Progress Note    Patient Name: Ophelia Velazco  : 1980  MRN: [id number]  Primary Care Physician: Simone Sarabia MD  Date of admission: 2021    Subjective   Subjective   HPI: COPD exacerbation, bilateral pneumonia  Patient says she is feeling some better, she still coughing start short of breath, at times, still complaining of headaches that come and go, she says her bilateral and in the occiput around the temple regions she says she gets 2-3 a week at times, and she is going to see me in the office for this issue, she denies nausea or vomiting, says her breathing is a little bit better overall, she is getting up and moving around some, she denies or does not want a nicotine patch    TELE--SINUS 68    Review of Systems   All systems were reviewed and negative except for: Headaches, still coughing, some shortness of breath but overall improved      Objective   Objective     Vitals:  Patient Vitals for the past 24 hrs:   BP Temp Temp src Pulse Resp SpO2   21 0648 119/57 98.2 °F (36.8 °C) Oral 71 18 98 %   21 0619 -- -- -- 77 18 96 %   21 0615 -- -- -- 86 18 95 %   21 0322 112/58 98 °F (36.7 °C) Oral 73 18 98 %   21 0038 -- -- -- 87 16 98 %   21 2344 112/68 98.2 °F (36.8 °C) Oral 77 18 99 %   21 1929 115/60 98.1 °F (36.7 °C) Oral 86 18 96 %   21 1922 -- -- -- 81 20 95 %   21 1918 -- -- -- 81 20 95 %   21 1518 121/63 98.1 °F (36.7 °C) Oral 104 22 96 %   21 1254 -- -- -- 81 21 94 %   21 1124 135/77 97.9 °F (36.6 °C) Oral 86 18 95 %      Physical Exam   Faint expiratory wheezes posterior left base, cardiac exam regular rhythm she is alert and oriented x3 her abdomen is obese soft supple, lower extremities show no edema, she is pleasant no rash on the face arms or legs, she moving all 4 extremities to command,      Result Review    Result Review:  I have personally reviewed the results from the time of this  admission to 11/03/21 7:45 AM EDT and agree with these findings:  [x]  Laboratory  []  Microbiology  [x]  Radiology  [x]  EKG/Telemetry   []  Cardiology/Vascular   []  Pathology  []  Old records  []  Other:      Active Hospital Meds:  Scheduled Meds:arformoterol, 15 mcg, Nebulization, BID - RT  azithromycin, 500 mg, Intravenous, Q24H  cefTRIAXone, 1 g, Intravenous, Q24H  enoxaparin, 40 mg, Subcutaneous, Q24H  escitalopram, 10 mg, Oral, Daily  famotidine, 20 mg, Intravenous, Q12H  guaiFENesin, 600 mg, Oral, Q12H  ipratropium-albuterol, 3 mL, Nebulization, 4x Daily - RT  methylPREDNISolone sodium succinate, 125 mg, Intravenous, Q8H  montelukast, 10 mg, Oral, Daily  sodium chloride, 10 mL, Intravenous, Q12H      Continuous Infusions:   PRN Meds:.•  acetaminophen **OR** acetaminophen **OR** acetaminophen  •  dextrose  •  dextrose  •  glucagon (human recombinant)  •  guaiFENesin-dextromethorphan  •  influenza vaccine  •  LORazepam  •  nitroglycerin  •  ondansetron  •  sodium chloride  •  sodium chloride    Assessment/Plan   Assessment / Plan     Active Hospital Problems:  Active Hospital Problems    Diagnosis    • Pneumonia        Assessment/Plan:     Bilateral basilar pneumonia, previously vaccinated for Covid, with positive booster dosing recently, continue day #2 IV antibiotics, breathing treatments, white count elevated due to steroids most likely, clinically overall improved discussed possible discharge with oral steroids tomorrow, monitor showed sinus rhythm, will DC continuous telemetry November 3     Asthma exacerbation/COPD exacerbation with tobacco abuse, patient does not want nicotine patch we will continue current treatment     Headaches chronic recurrent, headaches most likely tension chronic, MRI 2018 and 2020 October did not show any acute abnormalities, would consider a repeat and/or trial of amitriptyline at bedtime 10 mg starting November 3, will also order Toradol again today for  headaches,     Hypertension currently well controlled     Anxiety depression currently on Lexapro, currently stable     Gastroesophageal reflux disease     Polycystic ovary syndrome     Vitamin D deficiency     Mixed hyperlipidemia     Obesity     Tobacco abuse, discussed counseled on quitting, November 2 and 3,     Impaired fasting glucose versus type 2 diabetes, blood sugars elevating due to steroids, discussed with patient about going home on medications for this with her oral steroid need at discharge     Previous cholecystectomy, appendectomy and C-sections        Plan----------- continue current breathing treatments, IV steroids with insulin monitoring and possible sliding scale coverage, will add Brovana nebs to her medical regimen continue Singulair, her current Legionella and urine testing has been negative her mycoplasma IgM was negative, current Covid swab is also negative, will continue supportive care,  DVT prophylaxis will continue with Lovenox, GI prophylaxis with IV Pepcid, daily labs  Consider smoking cessation program nicotine patch etc.     Patient is considered seriously ill,           CODE STATUS:    Code Status and Medical Interventions:   Ordered at: 11/01/21 2040     Level Of Support Discussed With:    Patient     Code Status (Patient has no pulse and is not breathing):    CPR (Attempt to Resuscitate)     Medical Interventions (Patient has pulse or is breathing):    Full       Electronically signed by Simone Sarabia MD, 11/03/21, 7:45 AM EDT.

## 2021-11-03 NOTE — PLAN OF CARE
Goal Outcome Evaluation:  Plan of Care Reviewed With: patient        Progress: improving  Outcome Summary: pt resting in bed, 6L NC in place, o2 sats 96%, no c/o pain or discomfort at this time.

## 2021-11-03 NOTE — PLAN OF CARE
Goal Outcome Evaluation:              Outcome Summary: Patient alert and oriented x 4. Currently on 6L nasal cannula. The patient has complained of a headache today and pain in the chest and bilateral rib area related to coughing, see EMAR. Patient calm, pleasant, and cooperative. Resting in bed at this time. Patient is able to verbalize all needs and concerns to staff. Call light within reach. Patient has a good appetite.

## 2021-11-04 ENCOUNTER — READMISSION MANAGEMENT (OUTPATIENT)
Dept: CALL CENTER | Facility: HOSPITAL | Age: 41
End: 2021-11-04

## 2021-11-04 ENCOUNTER — APPOINTMENT (OUTPATIENT)
Dept: ULTRASOUND IMAGING | Facility: HOSPITAL | Age: 41
End: 2021-11-04

## 2021-11-04 VITALS
SYSTOLIC BLOOD PRESSURE: 115 MMHG | HEART RATE: 77 BPM | WEIGHT: 258.38 LBS | OXYGEN SATURATION: 93 % | DIASTOLIC BLOOD PRESSURE: 59 MMHG | TEMPERATURE: 97.7 F | RESPIRATION RATE: 16 BRPM | HEIGHT: 66 IN | BODY MASS INDEX: 41.52 KG/M2

## 2021-11-04 LAB
25(OH)D3 SERPL-MCNC: 19.3 NG/ML (ref 30–100)
ANION GAP SERPL CALCULATED.3IONS-SCNC: 6.9 MMOL/L (ref 5–15)
BASOPHILS # BLD AUTO: 0.01 10*3/MM3 (ref 0–0.2)
BASOPHILS NFR BLD AUTO: 0.1 % (ref 0–1.5)
BUN SERPL-MCNC: 20 MG/DL (ref 6–20)
BUN/CREAT SERPL: 37 (ref 7–25)
CALCIUM SPEC-SCNC: 8.6 MG/DL (ref 8.6–10.5)
CHLORIDE SERPL-SCNC: 103 MMOL/L (ref 98–107)
CO2 SERPL-SCNC: 25.1 MMOL/L (ref 22–29)
CREAT SERPL-MCNC: 0.54 MG/DL (ref 0.57–1)
DEPRECATED RDW RBC AUTO: 46 FL (ref 37–54)
EOSINOPHIL # BLD AUTO: 0 10*3/MM3 (ref 0–0.4)
EOSINOPHIL NFR BLD AUTO: 0 % (ref 0.3–6.2)
ERYTHROCYTE [DISTWIDTH] IN BLOOD BY AUTOMATED COUNT: 14.1 % (ref 12.3–15.4)
FOLATE SERPL-MCNC: 2.64 NG/ML (ref 4.78–24.2)
GFR SERPL CREATININE-BSD FRML MDRD: 123 ML/MIN/1.73
GLUCOSE BLDC GLUCOMTR-MCNC: 170 MG/DL (ref 70–99)
GLUCOSE BLDC GLUCOMTR-MCNC: 271 MG/DL (ref 70–99)
GLUCOSE SERPL-MCNC: 152 MG/DL (ref 65–99)
HCT VFR BLD AUTO: 41.1 % (ref 34–46.6)
HGB BLD-MCNC: 13.5 G/DL (ref 12–15.9)
IMM GRANULOCYTES # BLD AUTO: 0.13 10*3/MM3 (ref 0–0.05)
IMM GRANULOCYTES NFR BLD AUTO: 0.8 % (ref 0–0.5)
LYMPHOCYTES # BLD AUTO: 2.12 10*3/MM3 (ref 0.7–3.1)
LYMPHOCYTES NFR BLD AUTO: 13.8 % (ref 19.6–45.3)
MCH RBC QN AUTO: 29.5 PG (ref 26.6–33)
MCHC RBC AUTO-ENTMCNC: 32.8 G/DL (ref 31.5–35.7)
MCV RBC AUTO: 89.9 FL (ref 79–97)
MONOCYTES # BLD AUTO: 0.49 10*3/MM3 (ref 0.1–0.9)
MONOCYTES NFR BLD AUTO: 3.2 % (ref 5–12)
NEUTROPHILS NFR BLD AUTO: 12.64 10*3/MM3 (ref 1.7–7)
NEUTROPHILS NFR BLD AUTO: 82.1 % (ref 42.7–76)
NRBC BLD AUTO-RTO: 0 /100 WBC (ref 0–0.2)
PLATELET # BLD AUTO: 188 10*3/MM3 (ref 140–450)
PMV BLD AUTO: 11.4 FL (ref 6–12)
POTASSIUM SERPL-SCNC: 4.6 MMOL/L (ref 3.5–5.2)
RBC # BLD AUTO: 4.57 10*6/MM3 (ref 3.77–5.28)
SODIUM SERPL-SCNC: 135 MMOL/L (ref 136–145)
T4 FREE SERPL-MCNC: 0.89 NG/DL (ref 0.93–1.7)
TSH SERPL DL<=0.05 MIU/L-ACNC: 0.06 UIU/ML (ref 0.27–4.2)
VIT B12 BLD-MCNC: 444 PG/ML (ref 211–946)
WBC # BLD AUTO: 15.39 10*3/MM3 (ref 3.4–10.8)

## 2021-11-04 PROCEDURE — 84443 ASSAY THYROID STIM HORMONE: CPT | Performed by: INTERNAL MEDICINE

## 2021-11-04 PROCEDURE — 25010000002 METHYLPREDNISOLONE PER 125 MG: Performed by: INTERNAL MEDICINE

## 2021-11-04 PROCEDURE — 99239 HOSP IP/OBS DSCHRG MGMT >30: CPT | Performed by: INTERNAL MEDICINE

## 2021-11-04 PROCEDURE — 90686 IIV4 VACC NO PRSV 0.5 ML IM: CPT | Performed by: INTERNAL MEDICINE

## 2021-11-04 PROCEDURE — 94760 N-INVAS EAR/PLS OXIMETRY 1: CPT

## 2021-11-04 PROCEDURE — 76775 US EXAM ABDO BACK WALL LIM: CPT

## 2021-11-04 PROCEDURE — 25010000002 INFLUENZA VAC SPLIT QUAD 0.5 ML SUSPENSION PREFILLED SYRINGE: Performed by: INTERNAL MEDICINE

## 2021-11-04 PROCEDURE — 85025 COMPLETE CBC W/AUTO DIFF WBC: CPT | Performed by: INTERNAL MEDICINE

## 2021-11-04 PROCEDURE — 82607 VITAMIN B-12: CPT | Performed by: INTERNAL MEDICINE

## 2021-11-04 PROCEDURE — G0008 ADMIN INFLUENZA VIRUS VAC: HCPCS | Performed by: INTERNAL MEDICINE

## 2021-11-04 PROCEDURE — 63710000001 INSULIN LISPRO (HUMAN) PER 5 UNITS: Performed by: INTERNAL MEDICINE

## 2021-11-04 PROCEDURE — 25010000002 AZITHROMYCIN PER 500 MG: Performed by: INTERNAL MEDICINE

## 2021-11-04 PROCEDURE — 82306 VITAMIN D 25 HYDROXY: CPT | Performed by: INTERNAL MEDICINE

## 2021-11-04 PROCEDURE — 94799 UNLISTED PULMONARY SVC/PX: CPT

## 2021-11-04 PROCEDURE — 84439 ASSAY OF FREE THYROXINE: CPT | Performed by: INTERNAL MEDICINE

## 2021-11-04 PROCEDURE — 82962 GLUCOSE BLOOD TEST: CPT

## 2021-11-04 PROCEDURE — 25010000002 CEFTRIAXONE PER 250 MG: Performed by: INTERNAL MEDICINE

## 2021-11-04 PROCEDURE — 80048 BASIC METABOLIC PNL TOTAL CA: CPT | Performed by: INTERNAL MEDICINE

## 2021-11-04 PROCEDURE — 25010000002 KETOROLAC TROMETHAMINE PER 15 MG: Performed by: INTERNAL MEDICINE

## 2021-11-04 PROCEDURE — 82746 ASSAY OF FOLIC ACID SERUM: CPT | Performed by: INTERNAL MEDICINE

## 2021-11-04 PROCEDURE — 94618 PULMONARY STRESS TESTING: CPT

## 2021-11-04 RX ORDER — LEVOFLOXACIN 500 MG/1
500 TABLET, FILM COATED ORAL DAILY
Qty: 5 TABLET | Refills: 0 | Status: SHIPPED | OUTPATIENT
Start: 2021-11-04 | End: 2022-01-01

## 2021-11-04 RX ORDER — IPRATROPIUM BROMIDE AND ALBUTEROL SULFATE 2.5; .5 MG/3ML; MG/3ML
3 SOLUTION RESPIRATORY (INHALATION)
Qty: 240 ML | Refills: 5 | Status: SHIPPED | OUTPATIENT
Start: 2021-11-04 | End: 2022-06-27

## 2021-11-04 RX ORDER — GUAIFENESIN 600 MG/1
600 TABLET, EXTENDED RELEASE ORAL EVERY 12 HOURS SCHEDULED
Qty: 40 TABLET | Refills: 0 | Status: SHIPPED | OUTPATIENT
Start: 2021-11-04 | End: 2022-06-27

## 2021-11-04 RX ORDER — METFORMIN HYDROCHLORIDE 500 MG/1
500 TABLET, EXTENDED RELEASE ORAL
Qty: 60 TABLET | Refills: 5 | Status: SHIPPED | OUTPATIENT
Start: 2021-11-04 | End: 2022-06-27

## 2021-11-04 RX ORDER — PREDNISONE 20 MG/1
20 TABLET ORAL DAILY
Qty: 6 TABLET | Refills: 0 | Status: SHIPPED | OUTPATIENT
Start: 2021-11-04 | End: 2022-06-27

## 2021-11-04 RX ORDER — BUDESONIDE 0.5 MG/2ML
0.5 INHALANT ORAL
Qty: 60 EACH | Refills: 5 | Status: SHIPPED | OUTPATIENT
Start: 2021-11-04 | End: 2022-06-27

## 2021-11-04 RX ORDER — AMITRIPTYLINE HYDROCHLORIDE 10 MG/1
10 TABLET, FILM COATED ORAL NIGHTLY
Qty: 30 TABLET | Refills: 5 | Status: SHIPPED | OUTPATIENT
Start: 2021-11-04 | End: 2022-06-27

## 2021-11-04 RX ORDER — LORAZEPAM 0.5 MG/1
0.5 TABLET ORAL EVERY 6 HOURS PRN
Qty: 30 TABLET | Refills: 0 | Status: SHIPPED | OUTPATIENT
Start: 2021-11-04 | End: 2022-06-27

## 2021-11-04 RX ADMIN — CEFTRIAXONE SODIUM 1 G: 1 INJECTION, SOLUTION INTRAVENOUS at 09:20

## 2021-11-04 RX ADMIN — GUAIFENESIN 600 MG: 600 TABLET ORAL at 09:20

## 2021-11-04 RX ADMIN — METHYLPREDNISOLONE SODIUM SUCCINATE 125 MG: 125 INJECTION, POWDER, FOR SOLUTION INTRAMUSCULAR; INTRAVENOUS at 05:47

## 2021-11-04 RX ADMIN — INSULIN LISPRO 6 UNITS: 100 INJECTION, SOLUTION INTRAVENOUS; SUBCUTANEOUS at 12:43

## 2021-11-04 RX ADMIN — FAMOTIDINE 20 MG: 10 INJECTION INTRAVENOUS at 09:19

## 2021-11-04 RX ADMIN — HYDROCODONE BITARTRATE AND ACETAMINOPHEN 1 TABLET: 5; 325 TABLET ORAL at 09:26

## 2021-11-04 RX ADMIN — IPRATROPIUM BROMIDE AND ALBUTEROL SULFATE 3 ML: .5; 2.5 SOLUTION RESPIRATORY (INHALATION) at 00:00

## 2021-11-04 RX ADMIN — INSULIN LISPRO 2 UNITS: 100 INJECTION, SOLUTION INTRAVENOUS; SUBCUTANEOUS at 09:20

## 2021-11-04 RX ADMIN — INFLUENZA VIRUS VACCINE 0.5 ML: 15; 15; 15; 15 SUSPENSION INTRAMUSCULAR at 13:33

## 2021-11-04 RX ADMIN — MONTELUKAST 10 MG: 10 TABLET, FILM COATED ORAL at 09:20

## 2021-11-04 RX ADMIN — ARFORMOTEROL TARTRATE 15 MCG: 15 SOLUTION RESPIRATORY (INHALATION) at 06:23

## 2021-11-04 RX ADMIN — AZITHROMYCIN 500 MG: 500 INJECTION, POWDER, LYOPHILIZED, FOR SOLUTION INTRAVENOUS at 09:26

## 2021-11-04 RX ADMIN — ESCITALOPRAM OXALATE 10 MG: 10 TABLET ORAL at 09:20

## 2021-11-04 RX ADMIN — SODIUM CHLORIDE, PRESERVATIVE FREE 10 ML: 5 INJECTION INTRAVENOUS at 10:54

## 2021-11-04 RX ADMIN — IPRATROPIUM BROMIDE AND ALBUTEROL SULFATE 3 ML: .5; 2.5 SOLUTION RESPIRATORY (INHALATION) at 06:23

## 2021-11-04 RX ADMIN — KETOROLAC TROMETHAMINE 15 MG: 15 INJECTION, SOLUTION INTRAMUSCULAR; INTRAVENOUS at 05:53

## 2021-11-04 RX ADMIN — METHYLPREDNISOLONE SODIUM SUCCINATE 125 MG: 125 INJECTION, POWDER, FOR SOLUTION INTRAMUSCULAR; INTRAVENOUS at 14:24

## 2021-11-04 RX ADMIN — IPRATROPIUM BROMIDE AND ALBUTEROL SULFATE 3 ML: .5; 2.5 SOLUTION RESPIRATORY (INHALATION) at 12:02

## 2021-11-04 NOTE — SIGNIFICANT NOTE
11/04/21 1258   Plan   Final Discharge Disposition Code 01 - home or self-care   Final Note Pt discharged home with self care. Pt passed walk test, has neb at home. No dme needed. Dc medications received at pharmacy. Pt denies any dc needs.

## 2021-11-04 NOTE — PLAN OF CARE
Goal Outcome Evaluation:  Plan of Care Reviewed With: patient        Progress: improving  Outcome Summary: pt resting at this time, pt  on 3L NC, pt c/o lower back and lower abdomen pain, u/a sent, pain meds given.

## 2021-11-04 NOTE — DISCHARGE SUMMARY
[facility]         DISCHARGE SUMMARY    Patient Name: Ophelia Velazco  : 1980  MRN: [id number]    Date of Admission: 2021  Date of Discharge:  2021  Primary Care Physician: Simone Sarabia MD    Consults     Date and Time Order Name Status Description    2021  7:22 PM Internal Medicine (on-call MD unless specified) Completed           Presenting Problem:   Pneumonia [J18.9]    Active and Resolved Hospital Problems:  Active Hospital Problems    Diagnosis POA   • Pneumonia [J18.9] Yes      Resolved Hospital Problems   No resolved problems to display.         Hospital Course     Hospital Course:  Ophelia Velazco is a 41 y.o. female         Assessment/Plan:      Bilateral basilar pneumonia, previously vaccinated for Covid, with positive booster dosing recently, continue day #3 IV antibiotics, breathing treatments, white count elevated due to steroids most likely, but improved on the day of discharge to 15, clinically overall improved patient is agreeable and I think she can go home today, she had some abdominal pains low back pain and hematuria last night, urinalysis does show evidence of hematuria so will order renal ultrasound prior to discharge today, follow-up in the office, should be going home on antibiotics empirically anyway and I discussed that with her at time of discharge    Telemetry shows sinus rhythm, 2021     Asthma exacerbation/COPD exacerbation with tobacco abuse, patient does not want nicotine patch we will continue current treatment we will send home with steroids, nebulizer treatments, she already has the machine we will do Pulmicort and duo nebs along with a tapering dose of steroids, she already has Singulair at home and that we will continue     Headaches chronic recurrent, headaches most likely tension chronic, MRI  and 2020 did not show any acute abnormalities, would consider a repeat and/or trial of  amitriptyline at bedtime 10 mg starting November 3, will also order Toradol again today for headaches,, patient will go home with Elavil, may need increasing dose over the next several weeks, discussed with patient at time of discharge     Hypertension currently well controlled     Anxiety depression currently on Lexapro, currently stable, will send home with Ativan 0.5 every 6 as needed #30 for anxiety issues that she is worried about with her use of the steroids, 2021     Gastroesophageal reflux disease     Polycystic ovary syndrome she is going to go home on Metformin due to elevated blood sugars and the fact that she will be on steroids for another 5 to 6 days, 2021     Vitamin D deficiency     Mixed hyperlipidemia     Obesity     Tobacco abuse, discussed counseled on quitting,  and 3,     Impaired fasting glucose versus type 2 diabetes, blood sugars elevating due to steroids, discussed with patient about going home on medications for this with her oral steroid need at discharge as above with Metformin being sent home as a new medication for her,     Previous cholecystectomy, appendectomy and C-sections          CODE STATUS:        Code Status and Medical Interventions:   Ordered at: 21     Level Of Support Discussed With:     Patient     Code Status (Patient has no pulse and is not breathing):       Simone Sarabia MD   Physician   Medicine   Progress Notes      Signed   Date of Service:  21   Creation Time:  21              Signed        Expand All Collapse All          Show:Clear all  [x]Manual[x]Template[x]Copied    Added by:  [x]Simone Sarabia MD      []Elaine for details       [facility]   Progress Note     Patient Name: Ophelia Velazco  : 1980  MRN: [id number]  Primary Care Physician: Simone Sarabia MD  Date of admission: 2021        Subjective      Subjective   HPI: COPD exacerbation,  bilateral pneumonia  ,Review of Systems    Patient was feeling better today, less wheezing less short of breath had low abdominal pains back pain last night urinalysis ordered results reviewed, discussed with patient today about getting an ultrasound of her kidneys to rule out a kidney stone prior to her discharge, she is already on antibiotics, and we decided that she can go home today with antibiotics steroids and nebulizer treatments,                  All systems were reviewed and negative except for: Headaches, still coughing, some shortness of breath but overall improved              Objective      Objective      Vitals:  Patient Vitals for the past 24 hrs:    BP Temp Temp src Pulse Resp SpO2   11/03/21 0648 119/57 98.2 °F (36.8 °C) Oral 71 18 98 %   11/03/21 0619 -- -- -- 77 18 96 %   11/03/21 0615 -- -- -- 86 18 95 %   11/03/21 0322 112/58 98 °F (36.7 °C) Oral 73 18 98 %   11/03/21 0038 -- -- -- 87 16 98 %   11/02/21 2344 112/68 98.2 °F (36.8 °C) Oral 77 18 99 %   11/02/21 1929 115/60 98.1 °F (36.7 °C) Oral 86 18 96 %   11/02/21 1922 -- -- -- 81 20 95 %   11/02/21 1918 -- -- -- 81 20 95 %   11/02/21 1518 121/63 98.1 °F (36.7 °C) Oral 104 22 96 %   11/02/21 1254 -- -- -- 81 21 94 %   11/02/21 1124 135/77 97.9 °F (36.6 °C) Oral 86 18 95 %      Physical Exam             Faint expiratory wheezes posterior left base, cardiac exam regular rhythm she is alert and oriented x3 her abdomen is obese soft supple, lower extremities show no edema, she is pleasant no rash on the face arms or legs, she moving all 4 extremities to command,, she is very talkative she is getting up and down on her own,         Result Review       Result Review:  I have personally reviewed the results from the time of this admission to 11/03/21 7:45 AM EDT and agree with these findings:  [x]?  Laboratory  []?  Microbiology  [x]?  Radiology  [x]?  EKG/Telemetry   []?  Cardiology/Vascular   []?  Pathology  []?  Old records  []?   Other:        Active Hospital Meds:  Scheduled Meds:arformoterol, 15 mcg, Nebulization, BID - RT  azithromycin, 500 mg, Intravenous, Q24H  cefTRIAXone, 1 g, Intravenous, Q24H  enoxaparin, 40 mg, Subcutaneous, Q24H  escitalopram, 10 mg, Oral, Daily  famotidine, 20 mg, Intravenous, Q12H  guaiFENesin, 600 mg, Oral, Q12H  ipratropium-albuterol, 3 mL, Nebulization, 4x Daily - RT  methylPREDNISolone sodium succinate, 125 mg, Intravenous, Q8H  montelukast, 10 mg, Oral, Daily  sodium chloride, 10 mL, Intravenous, Q12H        Continuous Infusions:   PRN Meds:.•  acetaminophen **OR** acetaminophen **OR** acetaminophen  •  dextrose  •  dextrose  •  glucagon (human recombinant)  •  guaiFENesin-dextromethorphan  •  influenza vaccine  •  LORazepam  •  nitroglycerin  •  ondansetron  •  sodium chloride  •  sodium chloride           Assessment/Plan      Assessment / Plan      Active Hospital Problems:       Active Hospital Problems     Diagnosis     • Pneumonia           Assessment/Plan:      Bilateral basilar pneumonia, previously vaccinated for Covid, with positive booster dosing recently, continue day #3 IV antibiotics, breathing treatments, white count elevated due to steroids most likely, but improved on the day of discharge to 15, clinically overall improved patient is agreeable and I think she can go home today, she had some abdominal pains low back pain and hematuria last night, urinalysis does show evidence of hematuria so will order renal ultrasound prior to discharge today, follow-up in the office, should be going home on antibiotics empirically anyway and I discussed that with her at time of discharge    Telemetry shows sinus rhythm, November 4, 2021     Asthma exacerbation/COPD exacerbation with tobacco abuse, patient does not want nicotine patch we will continue current treatment we will send home with steroids, nebulizer treatments, she already has the machine we will do Pulmicort and duo nebs along with a tapering  dose of steroids, she already has Singulair at home and that we will continue     Headaches chronic recurrent, headaches most likely tension chronic, MRI 2018 and 2020 October did not show any acute abnormalities, would consider a repeat and/or trial of amitriptyline at bedtime 10 mg starting November 3, will also order Toradol again today for headaches,, patient will go home with Elavil, may need increasing dose over the next several weeks, discussed with patient at time of discharge     Hypertension currently well controlled     Anxiety depression currently on Lexapro, currently stable, will send home with Ativan 0.5 every 6 as needed #30 for anxiety issues that she is worried about with her use of the steroids, November 4, 2021     Gastroesophageal reflux disease     Polycystic ovary syndrome she is going to go home on Metformin due to elevated blood sugars and the fact that she will be on steroids for another 5 to 6 days, November 4, 2021     Vitamin D deficiency     Mixed hyperlipidemia     Obesity     Tobacco abuse, discussed counseled on quitting, November 2 and 3,     Impaired fasting glucose versus type 2 diabetes, blood sugars elevating due to steroids, discussed with patient about going home on medications for this with her oral steroid need at discharge as above with Metformin being sent home as a new medication for her,     Previous cholecystectomy, appendectomy and C-sections          CODE STATUS:        Code Status and Medical Interventions:   Ordered at: 11/01/21 2040     Level Of Support Discussed With:     Patient     Code Status (Patient has no pulse and is not breathing):     CPR (Attempt to Resuscitate)     Medical Interventions (Patient has pulse or is breathing):     Full         Electronically signed by Simone Sarabia MD, 11/03/21, 7:45 AM EDT.                              Day of Discharge     Patient Vitals for the past 24 hrs:   BP Temp Temp src Pulse Resp SpO2   11/04/21 0627 -- --  -- 64 18 93 %   11/04/21 0623 -- -- -- 65 18 96 %   11/04/21 0411 144/71 98.3 °F (36.8 °C) Oral 64 18 96 %   11/04/21 0049 133/71 98.3 °F (36.8 °C) Oral 63 18 96 %   11/04/21 0000 -- -- -- 78 18 94 %   11/03/21 2037 111/57 98.7 °F (37.1 °C) Oral 74 18 96 %   11/03/21 1837 -- -- -- 78 18 94 %   11/03/21 1832 -- -- -- 76 18 94 %   11/03/21 1549 137/67 98.4 °F (36.9 °C) Oral 86 18 94 %   11/03/21 1159 -- -- -- 77 16 96 %   11/03/21 1054 117/57 98.4 °F (36.9 °C) Oral 75 18 95 %        Physical Exam: See above,        Pertinent  and/or Most Recent Results     LAB RESULTS:      Lab 11/04/21  0544 11/03/21  0505 11/02/21  0456 11/01/21  1321   WBC 15.39* 20.02* 15.22* 9.45   HEMOGLOBIN 13.5 14.1 14.8 14.7   HEMATOCRIT 41.1 43.2 45.1 45.1   PLATELETS 188 191 202 206   NEUTROS ABS 12.64* 17.51* 13.09* 5.25   IMMATURE GRANS (ABS) 0.13* 0.14* 0.06* 0.03   LYMPHS ABS 2.12 1.73 1.66 2.81   MONOS ABS 0.49 0.61 0.39 0.81   EOS ABS 0.00 0.00 0.00 0.50*   MCV 89.9 90.4 90.0 90.2         Lab 11/03/21  0505 11/02/21  0456 11/01/21  1321   SODIUM 137 139 141   POTASSIUM 4.6 4.6 4.0   CHLORIDE 105 105 107   CO2 25.0 20.0* 22.3   ANION GAP 7.0 14.0 11.7   BUN 16 17 14   CREATININE 0.49* 0.66 0.59   GLUCOSE 167* 162* 104*   CALCIUM 8.9 9.3 8.9   MAGNESIUM 2.3 2.2  --          Lab 11/01/21  1321   TOTAL PROTEIN 7.1   ALBUMIN 4.00   GLOBULIN 3.1   ALT (SGPT) 32   AST (SGOT) 22   BILIRUBIN 0.3   ALK PHOS 97         Lab 11/01/21  1321   PROBNP 59.2   TROPONIN T <0.010                 Brief Urine Lab Results  (Last result in the past 365 days)      Color   Clarity   Blood   Leuk Est   Nitrite   Protein   CREAT   Urine HCG        11/03/21 1959 Yellow   Clear   Moderate (2+)   Negative   Negative   Trace               Microbiology Results (last 10 days)     Procedure Component Value - Date/Time    S. Pneumo Ag Urine or CSF - Urine, Urine, Clean Catch [[id number]]  (Normal) Collected: 11/01/21 2205    Lab Status: Final result Specimen: Urine,  Clean Catch Updated: 11/02/21 0606     Strep Pneumo Ag Negative    Legionella Antigen, Urine - Urine, Urine, Clean Catch [[id number]]  (Normal) Collected: 11/01/21 2205    Lab Status: Final result Specimen: Urine, Clean Catch Updated: 11/02/21 0606     LEGIONELLA ANTIGEN, URINE Negative    COVID-19,CEPHEID/LOIS/BDMAX,COR/GERRI/PAD/DEEPIKA IN-HOUSE(OR EMERGENT/ADD-ON),NP SWAB IN TRANSPORT MEDIA 3-4 HR TAT, RT-PCR - Swab, Nasopharynx [[id number]]  (Normal) Collected: 11/01/21 1958    Lab Status: Final result Specimen: Swab from Nasopharynx Updated: 11/01/21 2313     COVID19 Not Detected    Narrative:      Fact sheet for providers: https://www.fda.gov/media/144887/download     Fact sheet for patients: https://www.fda.gov/media/558106/download  Presumptive Positive: additional testing may be indicated if it is necessary to differentiate between SARS-CoV-2 and other Sarbecovirus, for epidemiological purposes, or clinical management.    Mycoplasma Pneumoniae Antibody, IgM - Blood, [[id number]]  (Normal) Collected: 11/01/21 1321    Lab Status: Final result Specimen: Blood Updated: 11/01/21 2303     Mycoplasma pneumo IgM Negative                           Labs Pending at Discharge:  Pending Labs     Order Current Status    Basic Metabolic Panel In process    Folate In process    TSH Rfx On Abnormal To Free T4 In process    Vitamin B12 In process    Vitamin D 25 Hydroxy In process          Imaging Results (All)     Procedure Component Value Units Date/Time    XR Chest 1 View [[id number]] Collected: 11/01/21 1341     Updated: 11/01/21 1344    Narrative:      PROCEDURE: XR CHEST 1 VW     COMPARISON: [facility], CR, CHEST AP/PA 1 VIEW, 5/05/2021, 9:17.     INDICATIONS: COUGH/CONGESTION/SHORTNESS OF BREATH     FINDINGS:   The heart size and pulmonary vascular markings are normal.  There is mild patchy right greater than   left lower lobe airspace disease which could be atelectasis or developing pneumonia.      CONCLUSION: Mild bilateral basilar patchy airspace disease suggestive of developing areas of   pneumonia     DORIAN WHITE MD         Electronically Signed and Approved By: DORIAN WHITE MD on 11/01/2021 at 13:41                          Discharge Details        Discharge Medications      New Medications      Instructions Start Date   amitriptyline 10 MG tablet  Commonly known as: ELAVIL   10 mg, Oral, Nightly      budesonide 0.5 MG/2ML nebulizer solution  Commonly known as: Pulmicort   0.5 mg, Nebulization, Daily - RT      guaiFENesin 600 MG 12 hr tablet  Commonly known as: MUCINEX   600 mg, Oral, Every 12 Hours Scheduled      ipratropium-albuterol 0.5-2.5 mg/3 ml nebulizer  Commonly known as: DUO-NEB   3 mL, Nebulization, 4 Times Daily - RT      levoFLOXacin 500 MG tablet  Commonly known as: Levaquin   500 mg, Oral, Daily      LORazepam 0.5 MG tablet  Commonly known as: Ativan   0.5 mg, Oral, Every 6 Hours PRN      metFORMIN  MG 24 hr tablet  Commonly known as: Glucophage XR   500 mg, Oral, Daily With Breakfast      predniSONE 20 MG tablet  Commonly known as: DELTASONE   20 mg, Oral, Daily         Changes to Medications      Instructions Start Date   montelukast 10 MG tablet  Commonly known as: SINGULAIR  What changed: when to take this   10 mg, Oral, Nightly         Continue These Medications      Instructions Start Date   albuterol sulfate  (90 Base) MCG/ACT inhaler  Commonly known as: PROVENTIL HFA;VENTOLIN HFA;PROAIR HFA   2 puffs, Inhalation, Every 4 Hours PRN      escitalopram 10 MG tablet  Commonly known as: LEXAPRO   Take 1 tablet by mouth once daily for 30 days             No Known Allergies      Discharge Disposition:  Home or Self Care    Diet:  Diet Instructions     Diet: Consistent Carbohydrate      Discharge Diet: Consistent Carbohydrate            Discharge Activity:   Activity Instructions     Activity as Tolerated              CODE STATUS:  Code Status and Medical Interventions:    Ordered at: 11/01/21 2040     Level Of Support Discussed With:    Patient     Code Status (Patient has no pulse and is not breathing):    CPR (Attempt to Resuscitate)     Medical Interventions (Patient has pulse or is breathing):    Full         Future Appointments   Date Time Provider Department Center   11/8/2021  3:45 PM Simone Sarabia [place] [facility]       Additional Instructions for the Follow-ups that You Need to Schedule     Discharge Follow-up with PCP   As directed       Currently Documented PCP:    Simone Sarabia MD    PCP Phone Number:    [phone]     Follow Up Details: followup in 7-10 days               Time spent on Discharge including face to face service:  35    minutes    Electronically signed by Simone Sarabia MD, 11/04/21, 6:58 AM EDT.

## 2021-11-04 NOTE — OUTREACH NOTE
Prep Survey      Responses   [facility] facility patient discharged from? [facility]   Is LACE score < 7 ? No   Emergency Room discharge w/ pulse ox? No   Eligibility [facility]   Date of Admission 11/01/21   Date of Discharge 11/04/21   Discharge Disposition Home or Self Care   Discharge diagnosis Pneumonia    Does the patient have one of the following disease processes/diagnoses(primary or secondary)? COPD/Pneumonia   Does the patient have Home health ordered? No   Is there a DME ordered? No   Prep survey completed? Yes          Edie Morel RN

## 2021-11-04 NOTE — NURSING NOTE
Exercise Oximetry    Patient Name:Ophelia Velazco   MRN: [id number]   Date: 11/04/21             ROOM AIR BASELINE   SpO2% 93   Heart Rate 77   Blood Pressure      EXERCISE ON ROOM AIR SpO2% EXERCISE ON O2 @    LPM SpO2%   1 MINUTE 92 1 MINUTE    2 MINUTES 92 2 MINUTES    3 MINUTES 93 3 MINUTES    4 MINUTES 94 4 MINUTES    5 MINUTES 93 5 MINUTES    6 MINUTES 94 6 MINUTES               Distance Walked  200 feet Distance Walked   Dyspnea (David Scale)   Dyspnea (David Scale)   Fatigue (David Scale)   Fatigue (David Scale)   SpO2% Post Exercise  93 SpO2% Post Exercise   HR Post Exercise  116 HR Post Exercise   Time to Recovery   Time to Recovery     Comments:

## 2021-11-05 ENCOUNTER — TRANSITIONAL CARE MANAGEMENT TELEPHONE ENCOUNTER (OUTPATIENT)
Dept: CALL CENTER | Facility: HOSPITAL | Age: 41
End: 2021-11-05

## 2021-11-05 NOTE — OUTREACH NOTE
Call Center TCM Note      Responses   [facility] patient discharged from? [facility]   Does the patient have one of the following disease processes/diagnoses(primary or secondary)? COPD/Pneumonia   Was the primary reason for admission: Pneumonia   TCM attempt successful? No   Unsuccessful attempts Attempt 2          Karen Maza RN    11/5/2021, 15:56 EDT

## 2021-11-05 NOTE — OUTREACH NOTE
Call Center TCM Note      Responses   [facility] patient discharged from? [facility]   Does the patient have one of the following disease processes/diagnoses(primary or secondary)? COPD/Pneumonia   Was the primary reason for admission: Pneumonia   TCM attempt successful? No   Unsuccessful attempts Attempt 1          Karen Maza RN    11/5/2021, 15:22 EDT

## 2021-11-06 ENCOUNTER — TRANSITIONAL CARE MANAGEMENT TELEPHONE ENCOUNTER (OUTPATIENT)
Dept: CALL CENTER | Facility: HOSPITAL | Age: 41
End: 2021-11-06

## 2021-11-06 NOTE — OUTREACH NOTE
Call Center TCM Note      Responses   [facility] patient discharged from? [facility]   Does the patient have one of the following disease processes/diagnoses(primary or secondary)? COPD/Pneumonia   Was the primary reason for admission: Pneumonia   TCM attempt successful? No  [No verbal release however according to CM notes pt lives with spouse]   Unsuccessful attempts Attempt 3          Bernadette Campbell RN    11/6/2021, 18:38 EDT

## 2021-11-08 ENCOUNTER — OFFICE VISIT (OUTPATIENT)
Dept: INTERNAL MEDICINE | Facility: CLINIC | Age: 41
End: 2021-11-08

## 2021-11-08 VITALS
TEMPERATURE: 97.5 F | BODY MASS INDEX: 41.05 KG/M2 | WEIGHT: 255.4 LBS | OXYGEN SATURATION: 98 % | DIASTOLIC BLOOD PRESSURE: 91 MMHG | HEART RATE: 103 BPM | HEIGHT: 66 IN | SYSTOLIC BLOOD PRESSURE: 142 MMHG

## 2021-11-08 DIAGNOSIS — Z90.3 S/P GASTRIC SLEEVE PROCEDURE: ICD-10-CM

## 2021-11-08 DIAGNOSIS — J45.901 MODERATE ASTHMA WITH ACUTE EXACERBATION, UNSPECIFIED WHETHER PERSISTENT: ICD-10-CM

## 2021-11-08 DIAGNOSIS — F41.1 ANXIETY, GENERALIZED: ICD-10-CM

## 2021-11-08 DIAGNOSIS — R73.01 IFG (IMPAIRED FASTING GLUCOSE): ICD-10-CM

## 2021-11-08 DIAGNOSIS — F33.1 MAJOR DEPRESSIVE DISORDER, RECURRENT, MODERATE (HCC): ICD-10-CM

## 2021-11-08 DIAGNOSIS — J18.9 PNEUMONIA OF BOTH LOWER LOBES DUE TO INFECTIOUS ORGANISM: Primary | ICD-10-CM

## 2021-11-08 DIAGNOSIS — E55.9 VITAMIN D DEFICIENCY: ICD-10-CM

## 2021-11-08 DIAGNOSIS — E66.01 MORBID (SEVERE) OBESITY DUE TO EXCESS CALORIES (HCC): ICD-10-CM

## 2021-11-08 DIAGNOSIS — E78.2 MIXED HYPERLIPIDEMIA: ICD-10-CM

## 2021-11-08 PROCEDURE — 99496 TRANSJ CARE MGMT HIGH F2F 7D: CPT | Performed by: INTERNAL MEDICINE

## 2021-11-08 PROCEDURE — 96372 THER/PROPH/DIAG INJ SC/IM: CPT | Performed by: INTERNAL MEDICINE

## 2021-11-08 RX ORDER — ESCITALOPRAM OXALATE 10 MG/1
10 TABLET ORAL DAILY
Qty: 90 TABLET | Refills: 3 | Status: SHIPPED | OUTPATIENT
Start: 2021-11-08 | End: 2022-12-14 | Stop reason: SDUPTHER

## 2021-11-08 RX ORDER — CYANOCOBALAMIN 1000 UG/ML
1000 INJECTION, SOLUTION INTRAMUSCULAR; SUBCUTANEOUS
Status: SHIPPED | OUTPATIENT
Start: 2021-11-08

## 2021-11-08 RX ADMIN — CYANOCOBALAMIN 1000 MCG: 1000 INJECTION, SOLUTION INTRAMUSCULAR; SUBCUTANEOUS at 17:10

## 2021-11-11 ENCOUNTER — READMISSION MANAGEMENT (OUTPATIENT)
Dept: CALL CENTER | Facility: HOSPITAL | Age: 41
End: 2021-11-11

## 2021-11-11 NOTE — OUTREACH NOTE
COPD/PN Week 2 Survey      Responses   [facility] patient discharged from? [facility]   Does the patient have one of the following disease processes/diagnoses(primary or secondary)? COPD/Pneumonia   Was the primary reason for admission: Pneumonia   Week 2 attempt successful? No   Unsuccessful attempts Attempt 1          Amita Stout RN

## 2021-11-15 ENCOUNTER — READMISSION MANAGEMENT (OUTPATIENT)
Dept: CALL CENTER | Facility: HOSPITAL | Age: 41
End: 2021-11-15

## 2021-11-15 NOTE — OUTREACH NOTE
COPD/PN Week 2 Survey      Responses   [facility] patient discharged from? [facility]   Does the patient have one of the following disease processes/diagnoses(primary or secondary)? COPD/Pneumonia   Was the primary reason for admission: Pneumonia   Week 2 attempt successful? No   Unsuccessful attempts Attempt 2          Luann Jane RN

## 2021-11-23 ENCOUNTER — READMISSION MANAGEMENT (OUTPATIENT)
Dept: CALL CENTER | Facility: HOSPITAL | Age: 41
End: 2021-11-23

## 2021-11-23 NOTE — OUTREACH NOTE
COPD/PN Week 3 Survey      Responses   [facility] patient discharged from? [facility]   Does the patient have one of the following disease processes/diagnoses(primary or secondary)? COPD/Pneumonia   Was the primary reason for admission: Pneumonia   Week 3 attempt successful? No   Unsuccessful attempts Attempt 1   Revoke Decline to participate          Bernadette Campbell RN

## 2022-01-01 PROCEDURE — U0004 COV-19 TEST NON-CDC HGH THRU: HCPCS | Performed by: EMERGENCY MEDICINE

## 2022-01-02 ENCOUNTER — TELEPHONE (OUTPATIENT)
Dept: URGENT CARE | Facility: CLINIC | Age: 42
End: 2022-01-02

## 2022-01-02 DIAGNOSIS — U07.1 COVID-19: Primary | ICD-10-CM

## 2022-01-02 NOTE — TELEPHONE ENCOUNTER
Spoke with the patient via telephone. Verify the patient's name, date of birth, street address. Patient's Covid test results are positive. Discussed quarantine, symptomatic management, ER precautions with the patient. Further discussed family testing as needed if they are symptomatic. All questions answered and patient verbalized understanding of information

## 2022-02-16 ENCOUNTER — TELEPHONE (OUTPATIENT)
Dept: INTERNAL MEDICINE | Facility: CLINIC | Age: 42
End: 2022-02-16

## 2022-02-16 DIAGNOSIS — J45.901 MODERATE ASTHMA WITH ACUTE EXACERBATION, UNSPECIFIED WHETHER PERSISTENT: ICD-10-CM

## 2022-02-16 DIAGNOSIS — J45.901 MODERATE ASTHMA WITH ACUTE EXACERBATION, UNSPECIFIED WHETHER PERSISTENT: Primary | ICD-10-CM

## 2022-02-16 RX ORDER — MONTELUKAST SODIUM 10 MG/1
10 TABLET ORAL EVERY MORNING
Qty: 90 TABLET | Refills: 1 | Status: SHIPPED | OUTPATIENT
Start: 2022-02-16 | End: 2023-02-22 | Stop reason: SDUPTHER

## 2022-02-16 RX ORDER — MONTELUKAST SODIUM 10 MG/1
10 TABLET ORAL EVERY MORNING
Qty: 90 TABLET | Refills: 1 | Status: SHIPPED | OUTPATIENT
Start: 2022-02-16 | End: 2022-02-16 | Stop reason: SDUPTHER

## 2022-02-16 NOTE — TELEPHONE ENCOUNTER
Caller: Ophelia Velazco    Relationship: Self    Best call back number: [phone]    Requested Prescriptions:   Requested Prescriptions      No prescriptions requested or ordered in this encounter    montelukast (SINGULAIR) 10 MG tablet    Pharmacy where request should be sent: [address] [place] [facility] - [phone]  - [phone]     Additional details provided by patient: PATIENT HAS TWO PILLS LEFT. SHE NEEDS A REFILL CURRENTLY AND REFILLS SENT IN FOR THE MEDICATION. SHE IS GOING OUT OF TOWN FOR 6 MONTHS FOR WORK.    Does the patient have less than a 3 day supply:  [x] Yes  [] No    Leonie Bolivar, Lena Rep   02/16/22 10:13 EST

## 2022-03-09 ENCOUNTER — TELEMEDICINE (OUTPATIENT)
Dept: INTERNAL MEDICINE | Facility: CLINIC | Age: 42
End: 2022-03-09

## 2022-03-09 DIAGNOSIS — R73.01 IFG (IMPAIRED FASTING GLUCOSE): ICD-10-CM

## 2022-03-09 DIAGNOSIS — F41.1 ANXIETY, GENERALIZED: ICD-10-CM

## 2022-03-09 DIAGNOSIS — J45.901 MODERATE ASTHMA WITH ACUTE EXACERBATION, UNSPECIFIED WHETHER PERSISTENT: ICD-10-CM

## 2022-03-09 DIAGNOSIS — F33.1 MAJOR DEPRESSIVE DISORDER, RECURRENT, MODERATE: ICD-10-CM

## 2022-03-09 DIAGNOSIS — E78.2 MIXED HYPERLIPIDEMIA: ICD-10-CM

## 2022-03-09 DIAGNOSIS — E66.01 MORBID (SEVERE) OBESITY DUE TO EXCESS CALORIES: Primary | ICD-10-CM

## 2022-03-09 DIAGNOSIS — Z90.3 S/P GASTRIC SLEEVE PROCEDURE: ICD-10-CM

## 2022-03-09 DIAGNOSIS — E55.9 VITAMIN D DEFICIENCY: ICD-10-CM

## 2022-03-09 PROCEDURE — 99213 OFFICE O/P EST LOW 20 MIN: CPT | Performed by: INTERNAL MEDICINE

## 2022-03-09 RX ORDER — ALBUTEROL SULFATE 90 UG/1
2 AEROSOL, METERED RESPIRATORY (INHALATION) EVERY 4 HOURS PRN
Qty: 8.5 G | Refills: 5 | Status: SHIPPED | OUTPATIENT
Start: 2022-03-09 | End: 2022-08-17

## 2022-03-09 NOTE — PROGRESS NOTES
Chief Complaint/ HPI: Follow-up with asthma COPD, previous hospital stay back in November, patient is doing much better has cut her cigarettes down to the 4 to 5 cigarettes/day, walking a lot losing weight, working out but no insurance still, reviewed medications with patient,          Objective   Vital Signs blood pressure 138/72, pulse rate was 80 and regular, respiratory rate 18,  Weight 256, BMI 42  There were no vitals filed for this visit.   There is no height or weight on file to calculate BMI.  Review of Systems   Constitutional: Negative.    HENT: Negative.    Eyes: Negative.    Respiratory: Negative.    Cardiovascular: Negative.    Gastrointestinal: Negative.    Endocrine: Negative.    Genitourinary: Negative.    Musculoskeletal: Negative.    Allergic/Immunologic: Negative.    Neurological: Negative.    Hematological: Negative.    Psychiatric/Behavioral: Negative.       Physical Exam  Constitutional:       General: She is not in acute distress.     Appearance: Normal appearance.   HENT:      Head: Normocephalic.   Eyes:      Pupils: Pupils are equal, round, and reactive to light.   Cardiovascular:      Rate and Rhythm: Normal rate and regular rhythm.      Pulses: Normal pulses.      Heart sounds: Normal heart sounds.   Pulmonary:      Effort: Pulmonary effort is normal.      Breath sounds: Normal breath sounds.   Musculoskeletal:         General: No swelling. Normal range of motion.      Right lower leg: Edema present.      Left lower leg: Edema present.   Skin:     Coloration: Skin is not jaundiced.   Neurological:      General: No focal deficit present.      Mental Status: She is alert and oriented to person, place, and time. Mental status is at baseline.   Psychiatric:         Mood and Affect: Mood normal.         Behavior: Behavior normal.         Thought Content: Thought content normal.         Judgment: Judgment normal.        Result Review :   Lab Results   Component Value Date    PROBNP 59.2  11/01/2021    BNP 53 05/05/2021    BNP 34 10/16/2020     CMP    CMP 11/2/21 11/3/21 11/4/21   Glucose 162 (A) 167 (A) 152 (A)   BUN 17 16 20   Creatinine 0.66 0.49 (A) 0.54 (A)   eGFR Non African Am 98 138 123   Sodium 139 137 135 (A)   Potassium 4.6 4.6 4.6   Chloride 105 105 103   Calcium 9.3 8.9 8.6   (A) Abnormal value       Comments are available for some flowsheets but are not being displayed.           CBC w/diff    CBC w/Diff 11/2/21 11/3/21 11/4/21   WBC 15.22 (A) 20.02 (A) 15.39 (A)   RBC 5.01 4.78 4.57   Hemoglobin 14.8 14.1 13.5   Hematocrit 45.1 43.2 41.1   MCV 90.0 90.4 89.9   MCH 29.5 29.5 29.5   MCHC 32.8 32.6 32.8   RDW 14.0 14.1 14.1   Platelets 202 191 188   Neutrophil Rel % 86.0 (A) 87.6 (A) 82.1 (A)   Immature Granulocyte Rel % 0.4 0.7 (A) 0.8 (A)   Lymphocyte Rel % 10.9 (A) 8.6 (A) 13.8 (A)   Monocyte Rel % 2.6 (A) 3.0 (A) 3.2 (A)   Eosinophil Rel % 0.0 (A) 0.0 (A) 0.0 (A)   Basophil Rel % 0.1 0.1 0.1   (A) Abnormal value                Lab Results   Component Value Date    TSH 0.056 (L) 11/04/2021    TSH 0.142 (L) 10/18/2020    TSH 1.920 08/12/2020      Lab Results   Component Value Date    FREET4 0.89 (L) 11/04/2021    FREET4 1.1 10/18/2020    FREET4 1.2 08/12/2020                          Visit Diagnoses:    ICD-10-CM ICD-9-CM   1. Morbid (severe) obesity due to excess calories (HCC)  E66.01 278.01   2. Anxiety, generalized  F41.1 300.02   3. Moderate asthma with acute exacerbation, unspecified whether persistent  J45.901 493.92   4. Mixed hyperlipidemia  E78.2 272.2   5. S/P gastric sleeve procedure  Z90.3 V45.75   6. IFG (impaired fasting glucose)  R73.01 790.21   7. Vitamin D deficiency  E55.9 268.9   8. Major depressive disorder, recurrent, moderate (HCC)  F33.1 296.32       Assessment and Plan   Diagnoses and all orders for this visit:    1. Morbid (severe) obesity due to excess calories (HCC) (Primary)  -     Lipid Panel; Future  -     Comprehensive Metabolic Panel; Future  -     CBC &  Differential; Future  -     Hemoglobin A1c; Future    2. Anxiety, generalized  -     Lipid Panel; Future  -     Comprehensive Metabolic Panel; Future  -     CBC & Differential; Future  -     Hemoglobin A1c; Future    3. Moderate asthma with acute exacerbation, unspecified whether persistent  -     Lipid Panel; Future  -     Comprehensive Metabolic Panel; Future  -     CBC & Differential; Future  -     Hemoglobin A1c; Future    4. Mixed hyperlipidemia  -     Lipid Panel; Future  -     Comprehensive Metabolic Panel; Future  -     CBC & Differential; Future  -     Hemoglobin A1c; Future    5. S/P gastric sleeve procedure  -     Lipid Panel; Future  -     Comprehensive Metabolic Panel; Future  -     CBC & Differential; Future  -     Hemoglobin A1c; Future    6. IFG (impaired fasting glucose)  -     Lipid Panel; Future  -     Comprehensive Metabolic Panel; Future  -     CBC & Differential; Future  -     Hemoglobin A1c; Future    7. Vitamin D deficiency  -     Lipid Panel; Future  -     Comprehensive Metabolic Panel; Future  -     CBC & Differential; Future  -     Hemoglobin A1c; Future    8. Major depressive disorder, recurrent, moderate (HCC)  -     Lipid Panel; Future  -     Comprehensive Metabolic Panel; Future  -     CBC & Differential; Future  -     Hemoglobin A1c; Future    Other orders  -     albuterol sulfate  (90 Base) MCG/ACT inhaler; Inhale 2 puffs Every 4 (Four) Hours As Needed for Wheezing.  Dispense: 8.5 g; Refill: 5        Bilateral basilar pneumonia, previously vaccinated for Covid, with positive booster dosing , back pain and hematuria last night, urinalysis does show evidence of hematuria--- renal ultrasound showed no abnormalities, normal-sized kidneys without hydronephrosis, November 4, 2021     Asthma exacerbation/COPD exacerbation with tobacco abuse,---discussed quitting again , march 2022----patient needs refills of albuterol inhaler, unable to afford the long-acting combo medications i.e.  Symbicort right now with no insurance,, continue Singulair 10 mg daily,     Headaches chronic recurrent, headaches most likely tension chronic, MRI 2018 and 2020 October did not show any acute abnormalities, --cont elavil 10 mg qhs      Anxiety depression ---cont Lexapro 10 mg qd  , ativan prn      Polycystic ovary syndrome she is going to go home on Metformin due to elevated blood sugars and the fact that she will be on steroids for another 5 to 6 days, November 4, 2021--- patient has stopped, March 2022,     Vitamin D deficiency--not on replacement,     Mixed hyperlipidemia---no rx      Obesity     Impaired fasting glucose versus type 2 diabetes,      Previous cholecystectomy, appendectomy and C-sections     Hospital stay February, discharged February 9, 2018 for seizure-like activity with mental status changes, developed respiratory failure, bibasilar atelectasis pneumonia was treated in the hospital with IV antibiotics steroids for wheezing breathing treatments, outside [facility] scan from [facility] showed no PE, But bibasilar atelectasis consolidations, February 2018 was started on Vimpat by neurology, propranolol was changed to metoprolol due to the wheezing and respiratory issues, patient had MRI of the brain was negative for any significant findings, she had severe back pain neck pain posterior in the hospital that was treated with Toradol, no narcotics  OCCIPITAL NEURALGIA, FEB 2018, --WILL CHANGE TO INDERAL 40 MG BID   Morbid Obesity - s/p gastric sleeve-November 2016,--patient's weight was 289 October 31, 2016 currently at 229 February 14, 2017 total of 60 pounds,    INCREASING LFTS, W/U 5/2015, ALL NEG, ALL NL MAY 2017,   SYNCOPE WITH POSSIBLE SZ / ? ANXIETY 11/25/15. WENT TO ER.--  ANXIETY, PANIC ATTACKS , RX DISCUSSED RISK OF ADDICTION WITH [facility]   HEMATURIA, W/U WITH U/S BLADDER--FOR UROLOGY , DID SEE UROLOGY MICHAEL   PCOS, FOR KIRILL SOON ?          Follow Up   Return in about 6 months (around  9/9/2022).  Patient was given instructions and counseling regarding her condition or for health maintenance advice. Please see specific information pulled into the AVS if appropriate.

## 2022-05-25 ENCOUNTER — TELEPHONE (OUTPATIENT)
Dept: ORTHOPEDIC SURGERY | Facility: CLINIC | Age: 42
End: 2022-05-25

## 2022-05-25 NOTE — TELEPHONE ENCOUNTER
Tibial plateau fracture, right, sequela. PT SEEN DR VERDUGO AT [facility] AND [facility]. RCVD AND INDXD XRAY RT KNEE 5.19.22 AND D/C SUMMARY 4/28/2, INDXD CONT OF HOSPITAL RECORDS AS EXT MED RECS: INCLUDES OP REPORT, IMAGING REPORT

## 2022-06-01 ENCOUNTER — OFFICE VISIT (OUTPATIENT)
Dept: ORTHOPEDIC SURGERY | Facility: CLINIC | Age: 42
End: 2022-06-01

## 2022-06-01 ENCOUNTER — TELEPHONE (OUTPATIENT)
Dept: ORTHOPEDIC SURGERY | Facility: CLINIC | Age: 42
End: 2022-06-01

## 2022-06-01 VITALS — OXYGEN SATURATION: 97 % | HEIGHT: 66 IN | BODY MASS INDEX: 41.18 KG/M2 | HEART RATE: 106 BPM

## 2022-06-01 DIAGNOSIS — Z47.89 ORTHOPEDIC AFTERCARE: ICD-10-CM

## 2022-06-01 DIAGNOSIS — M25.561 RIGHT KNEE PAIN, UNSPECIFIED CHRONICITY: Primary | ICD-10-CM

## 2022-06-01 DIAGNOSIS — S82.141A TIBIAL PLATEAU FRACTURE, RIGHT, CLOSED, INITIAL ENCOUNTER: ICD-10-CM

## 2022-06-01 PROCEDURE — 99203 OFFICE O/P NEW LOW 30 MIN: CPT | Performed by: ORTHOPAEDIC SURGERY

## 2022-06-01 RX ORDER — OXYCODONE HYDROCHLORIDE 5 MG/1
5-10 TABLET ORAL EVERY 8 HOURS PRN
COMMUNITY
Start: 2022-05-11 | End: 2022-06-27
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
## 2022-06-27 ENCOUNTER — OFFICE VISIT (OUTPATIENT)
Dept: ORTHOPEDIC SURGERY | Facility: CLINIC | Age: 42
End: 2022-06-27

## 2022-06-27 VITALS — HEART RATE: 120 BPM | WEIGHT: 276.6 LBS | OXYGEN SATURATION: 96 % | HEIGHT: 68 IN | BODY MASS INDEX: 41.92 KG/M2

## 2022-06-27 DIAGNOSIS — Z47.89 ORTHOPEDIC AFTERCARE: ICD-10-CM

## 2022-06-27 DIAGNOSIS — M25.561 RIGHT KNEE PAIN, UNSPECIFIED CHRONICITY: Primary | ICD-10-CM

## 2022-06-27 PROCEDURE — 99024 POSTOP FOLLOW-UP VISIT: CPT | Performed by: PHYSICIAN ASSISTANT

## 2022-06-27 NOTE — PATIENT INSTRUCTIONS
Discussed PWB over the next 2 weeks, able to progress to 50% weightbearing over the next two weeks. After two weeks of 50% weightbearing with walker, can progress to WBAT. PT prescription provided today to begin working on ROM of the knee. Continue the normal knee brace.       Follow up in 3 weeks. Repeat imaging.

## 2022-06-27 NOTE — PROGRESS NOTES
"Chief Complaint  Follow-up of the Right Knee    Subjective          Ophelia Velazco presents to [facility] for follow-up of right knee s/p right tibial plateau ORIF performed on 04/26/2022 by Dr. Diaz.  Patient has been working on gentle knee been.  She has been partially weightbearing with 30% weightbearing with use of walker.  Patient states she has some pain on the lateral aspect of her knee, otherwise she feels she has progressed well.  She would like to begin therapy today if possible.    Objective   No Known Allergies    Vital Signs:   Pulse 120   Ht 172.7 cm (68\")   Wt 125 kg (276 lb 9.6 oz)   SpO2 96%   BMI 42.06 kg/m²       Physical Exam  Constitutional:       Appearance: Normal appearance. Patient is well-developed and normal weight.   HENT:      Head: Normocephalic.      Right Ear: Hearing and external ear normal.      Left Ear: Hearing and external ear normal.      Nose: Nose normal.   Eyes:      Conjunctiva/sclera: Conjunctivae normal.   Cardiovascular:      Rate and Rhythm: Normal rate.   Pulmonary:      Effort: Pulmonary effort is normal.      Breath sounds: No wheezing or rales.   Abdominal:      Palpations: Abdomen is soft.      Tenderness: There is no abdominal tenderness.   Musculoskeletal:      Cervical back: Normal range of motion.   Skin:     Findings: No rash.   Neurological:      Mental Status: Patient is alert and oriented to person, place, and time.   Psychiatric:         Mood and Affect: Mood and affect normal.         Judgment: Judgment normal.     Ortho Exam  Right knee: Well-healed surgical incision, skin dry and intact.  Full passive knee extension and flexion to 90 degrees.  Full plantarflexion and dorsiflexion of the ankle.  Sensation and pulses are intact.  Neurovascular intact distally.  Result Review :            Imaging Results (Most Recent)     Procedure Component Value Units Date/Time    XR Knee 3 View Right [[id number]] Resulted: 06/27/22 " 1551     Updated: 06/27/22 1551    Narrative:      X-Ray Report:  Study: X-rays ordered, taken in the office, and reviewed today  Site: right knee Xray  Indication: ORIF tibial plateau   View: AP, Lateral and Sunrise view(s)  Findings: Hardware of ORIF of the lateral tibia plateau is intact without   evidence of screw loosening or hardware failure.   Prior studies available for comparison: yes                   Assessment and Plan    Problem List Items Addressed This Visit        Musculoskeletal and Injuries    Aftercare following right tibial plateau ORIF    Relevant Orders    Ambulatory Referral to Physical Therapy Evaluate and treat, POST OP      Other Visit Diagnoses     Right knee pain, unspecified chronicity    -  Primary    Relevant Orders    XR Knee 3 View Right (Completed)        Reviewed x-ray imaging with Dr. Diaz.  He would like her to begin progressing to 50% weightbearing over the next 2 weeks then weightbearing as tolerated thereafter.  Continue with walker over the next 2 weeks.  Therapy prescription provided today.  Follow-up in 3 weeks to repeat x-ray.      Follow Up   Return in about 3 weeks (around 7/18/2022).  Patient Instructions   Discussed PWB over the next 2 weeks, able to progress to 50% weightbearing over the next two weeks. After two weeks of 50% weightbearing with walker, can progress to WBAT. PT prescription provided today to begin working on ROM of the knee. Continue the normal knee brace.       Follow up in 3 weeks. Repeat imaging.     Patient was given instructions and counseling regarding her condition or for health maintenance advice. Please see specific information pulled into the AVS if appropriate.

## 2022-06-29 ENCOUNTER — TREATMENT (OUTPATIENT)
Dept: PHYSICAL THERAPY | Facility: CLINIC | Age: 42
End: 2022-06-29

## 2022-06-29 DIAGNOSIS — M25.561 RIGHT KNEE PAIN, UNSPECIFIED CHRONICITY: ICD-10-CM

## 2022-06-29 DIAGNOSIS — Z98.890 S/P RIGHT KNEE SURGERY: Primary | ICD-10-CM

## 2022-06-29 DIAGNOSIS — R26.9 GAIT DISTURBANCE: ICD-10-CM

## 2022-06-29 PROCEDURE — 97110 THERAPEUTIC EXERCISES: CPT | Performed by: PHYSICAL THERAPIST

## 2022-06-29 PROCEDURE — 97161 PT EVAL LOW COMPLEX 20 MIN: CPT | Performed by: PHYSICAL THERAPIST
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
## 2022-07-05 ENCOUNTER — TREATMENT (OUTPATIENT)
Dept: PHYSICAL THERAPY | Facility: CLINIC | Age: 42
End: 2022-07-05

## 2022-07-05 DIAGNOSIS — Z98.890 S/P RIGHT KNEE SURGERY: Primary | ICD-10-CM

## 2022-07-05 DIAGNOSIS — R26.9 GAIT DISTURBANCE: ICD-10-CM

## 2022-07-05 DIAGNOSIS — M25.561 RIGHT KNEE PAIN, UNSPECIFIED CHRONICITY: ICD-10-CM

## 2022-07-05 PROCEDURE — 97110 THERAPEUTIC EXERCISES: CPT | Performed by: PHYSICAL THERAPIST

## 2022-07-05 NOTE — PROGRESS NOTES
Physical Therapy Daily Treatment Note      Patient: Ophelia Velazco   : 1980  Referring practitioner: BILL Curry  Date of Initial Visit: Type: THERAPY  Noted: 2022  Today's Date: 2022  Patient seen for 2 sessions         Ophelia Velazco reports: no pain today. She sdid       Subjective     Objective   See Exercise, Manual, and Modality Logs for complete treatment.       Assessment & Plan     Assessment    Assessment details: Pt tolerated session and is progressing well toward long term goals. Pt continues to follow WBing precautions. Progress next visit as tolerated.         Visit Diagnoses:    ICD-10-CM ICD-9-CM   1. S/P right knee surgery  Z98.890 V45.89   2. Gait disturbance  R26.9 781.2   3. Right knee pain, unspecified chronicity  M25.561 719.46       Progress per Plan of Care           Timed:         Manual Therapy:    0     mins  41224;     Therapeutic Exercise:   40   mins  42037;     Neuromuscular Micah:    0    mins  33485;    Therapeutic Activity:     0     mins  83229;     Gait Trainin     mins  98340;     Ultrasound:     0     mins  94638;    Ionto                               0    mins   20061  Self-care  __0__ mins 20611    Un-Timed:  Electrical Stimulation:    0     mins  14507 ( );  Traction     0     mins 76754  Hot pack     0     Mins    Cold pack                       0     Mins      Timed Treatment:   40   mins   Total Treatment:     40   mins    Radha Sun PT, DPT  Physical Therapist    NPI:[id number]  [place] License: [id number]

## 2022-07-07 ENCOUNTER — TREATMENT (OUTPATIENT)
Dept: PHYSICAL THERAPY | Facility: CLINIC | Age: 42
End: 2022-07-07

## 2022-07-07 DIAGNOSIS — R26.9 GAIT DISTURBANCE: ICD-10-CM

## 2022-07-07 DIAGNOSIS — M25.561 RIGHT KNEE PAIN, UNSPECIFIED CHRONICITY: ICD-10-CM

## 2022-07-07 DIAGNOSIS — Z98.890 S/P RIGHT KNEE SURGERY: Primary | ICD-10-CM

## 2022-07-07 PROCEDURE — 97110 THERAPEUTIC EXERCISES: CPT | Performed by: PHYSICAL THERAPIST

## 2022-07-07 PROCEDURE — 97530 THERAPEUTIC ACTIVITIES: CPT | Performed by: PHYSICAL THERAPIST

## 2022-07-07 NOTE — PROGRESS NOTES
"Physical Therapy Daily Treatment Note        Patient: Ophelia Velazco   : 1980  Diagnosis/ICD-10 Code:  S/P right knee surgery [Z98.890]  Referring practitioner: BILL Curry  Date of Initial Visit: Type: THERAPY  Noted: 2022  Today's Date: 2022  Patient seen for 3 sessions             Subjective   Ophelia Velazco reports having 3-4/10 pain in her right knee upon arrival.  She describes as \"Aching\".    Objective     Active Range of Motion   Left Knee   Flexion: 130 degrees   Extension: 0 degrees      Right Knee   Flexion: 120 degrees   Extension: (-5) degrees (Lacking full extension)     Passive Range of Motion:  Flexion:  125 degrees with pain  Extension:   (-3) degrees (Lacking full extension)       Strength/Myotome Testing        Right Knee Strength:    Flexion: 4-/5  Extension:  3+/5  Quadriceps contraction: poor +       See Exercise  Logs for complete treatment.       Assessment/Plan     Pt tolerated therapy session well - with progression of therapeutic exercises, CKC-Functional activities, and Manual therapy. She has improved, but continues to have deficits in Right Knee ROM,  Strength, Stability, and Pain; limiting function and ability to perform ADLs  at this time.  Weight Bearing continues to be limited on right Lower Extremity; but continues to progress well towards WBAT.    Progress per Plan of Care           Timed:  Manual Therapy:    5     mins  37793;  Therapeutic Exercise:    40     mins  16622;     Neuromuscular Micah:    0    mins  52536;    Therapeutic Activity:     10     mins  92624;     Gait Trainin     mins  26793;     Ultrasound:     0     mins  95938;    Electrical Stimulation:    0     mins  08312;  Iontophoresis     0     mins  87547    Untimed:  Electrical Stimulation:    0     mins  79684 ( );  Mechanical Traction:    0     mins  86675;   Fluidotherapy     0     mins  35685  Hot pack     0     mins  91833  Cold pack     0     mins  " 85698    Timed Treatment:   55   mins   Total Treatment:     55   mins        Neetu Salazar PTA  Physical Therapist Assistant

## 2022-07-11 ENCOUNTER — TELEPHONE (OUTPATIENT)
Dept: ORTHOPEDIC SURGERY | Facility: CLINIC | Age: 42
End: 2022-07-11

## 2022-07-11 NOTE — TELEPHONE ENCOUNTER
Hub staff attempted to follow warm transfer process and was unsuccessful     Caller: KIM     Relationship to patient: NURSE   (ADJ Griselda BERGER)    Best call back number: [phone]    Patient is needing: NEEDS TALK TO SOMEONE IN OFFICE - NEES UPDATE ON PATIENT JUST TOOK OVER CASE. PLEASE A CALL AND ADVISE.     - LOOKING FOR UPDATED OFFICE NOTES, WORK STATUS, APPT INFORMATION ETC.         [phone] -- PLEASE ADD CASE NUMBER [id number]

## 2022-07-13 ENCOUNTER — TREATMENT (OUTPATIENT)
Dept: PHYSICAL THERAPY | Facility: CLINIC | Age: 42
End: 2022-07-13

## 2022-07-13 DIAGNOSIS — R26.9 GAIT DISTURBANCE: ICD-10-CM

## 2022-07-13 DIAGNOSIS — Z98.890 S/P RIGHT KNEE SURGERY: Primary | ICD-10-CM

## 2022-07-13 DIAGNOSIS — M25.561 RIGHT KNEE PAIN, UNSPECIFIED CHRONICITY: ICD-10-CM

## 2022-07-13 PROCEDURE — 97110 THERAPEUTIC EXERCISES: CPT | Performed by: PHYSICAL THERAPIST

## 2022-07-13 PROCEDURE — 97140 MANUAL THERAPY 1/> REGIONS: CPT | Performed by: PHYSICAL THERAPIST

## 2022-07-13 PROCEDURE — 97530 THERAPEUTIC ACTIVITIES: CPT | Performed by: PHYSICAL THERAPIST

## 2022-07-13 NOTE — PROGRESS NOTES
"Physical Therapy Daily Treatment Note        Patient: Ophelia Velazco   : 1980  Diagnosis/ICD-10 Code:  S/P right knee surgery [Z98.890]  Referring practitioner: BILL Curry  Date of Initial Visit: Type: THERAPY  Noted: 2022  Today's Date: 2022  Patient seen for 4 sessions             Subjective   Ophelia Velazco reports having increased pain and stiffness in her knee after increased walking yesterday.  She rates her pain at 4-5/10 at medial/posterior knee.  Pt tearful throughout session- stating she feels as if she has \"Taken a step backwards\".    Objective             Strength Testing      Right Knee   Flexion: 3+/5- (\"Sore\"-\"Painful\")  Extension: 3+/5  Right Knee   Quadriceps contraction: poor+  Extensor Lag:  15-30 degrees during SLR performance  (Lag increases with fatigue)       See Exercise and Manual Logs for complete treatment.       Assessment/Plan     Pt tolerated therapy session well - with progression of therapeutic exercises, CKC-Functional activities, and Manual therapy. She has improved, but continues to have deficits in her Right Hip/Knee ROM,  Strength, and Stability; limiting function and ability to perform ADLs without pain or difficulty at this time.  Therapy progression hindered somewhat by patient's increased pain and muscle tightness at medial / posterior hamstring.  She did however respond well to Manual therapy- Soft tissue mobilization at medial hamstring insertion- reporting decrease in pain and tightness post session.    Progress per Plan of Care           Timed:  Manual Therapy:    10     mins  16666;  Therapeutic Exercise:    35     mins  24843;     Neuromuscular Micah:    0    mins  20695;    Therapeutic Activity:     8     mins  75177;     Gait Trainin     mins  69452;     Ultrasound:     0     mins  86126;    Electrical Stimulation:    0     mins  95906;  Iontophoresis     0     mins  96467    Untimed:  Electrical Stimulation:    0     mins  " 11203 ( );  Mechanical Traction:    0     mins  04440;   Fluidotherapy     0     mins  58218  Hot pack     0     mins  55953  Cold pack     0     mins  52604    Timed Treatment:   53   mins   Total Treatment:     53   mins        Neetu Salazar PTA  Physical Therapist Assistant

## 2022-07-15 ENCOUNTER — TREATMENT (OUTPATIENT)
Dept: PHYSICAL THERAPY | Facility: CLINIC | Age: 42
End: 2022-07-15

## 2022-07-15 DIAGNOSIS — R26.9 GAIT DISTURBANCE: ICD-10-CM

## 2022-07-15 DIAGNOSIS — Z98.890 S/P RIGHT KNEE SURGERY: Primary | ICD-10-CM

## 2022-07-15 DIAGNOSIS — M25.561 RIGHT KNEE PAIN, UNSPECIFIED CHRONICITY: ICD-10-CM

## 2022-07-15 PROCEDURE — 97530 THERAPEUTIC ACTIVITIES: CPT | Performed by: PHYSICAL THERAPIST

## 2022-07-15 PROCEDURE — 97140 MANUAL THERAPY 1/> REGIONS: CPT | Performed by: PHYSICAL THERAPIST

## 2022-07-15 PROCEDURE — 97110 THERAPEUTIC EXERCISES: CPT | Performed by: PHYSICAL THERAPIST

## 2022-07-15 NOTE — PROGRESS NOTES
"Physical Therapy Daily Treatment Note        Patient: Ophelia Velazco   : 1980  Diagnosis/ICD-10 Code:  S/P right knee surgery [Z98.890]  Referring practitioner: BILL Curry  Date of Initial Visit: Type: THERAPY  Noted: 2022  Today's Date: 7/15/2022  Patient seen for 5 sessions             Subjective   Ophelia Velazco denies having any pain upon  Arrival today.  She states that she has been feeling much better and states she has been using \"Biofreeze\" on her hamstring which has been helping as well.    Objective       Strength Testing      Right Knee   Flexion: 4-/5- (\"Sore\"-\"Painful\")  Extension: 4-/5/5    Right Knee   Quadriceps contraction: poor+  Extensor Lag:  10-30 degrees during SLR performance  (Lag increases with fatigue)       See Exercise and Manual Logs for complete treatment.       Assessment/Plan     Pt tolerated therapy session well - with progression of therapeutic exercises, CKC-Functional activities, and Manual therapy. She has improved, but continues to have deficits in her Right Hip/Knee ROM,  Strength, and Stability; limiting function and ability to perform ADLs without pain or difficulty at this time.  Pt continues to respond well to Manual therapy- Soft tissue mobilization at medial hamstring insertion- reporting decrease in pain and tightness post session.  Good Tolerance to  gentle progression of increasing right LE weight bearing from 50% to WBAT as per MD instructions.     Progress per Plan of Care           Timed:  Manual Therapy:    10     mins  63389;  Therapeutic Exercise:    35     mins  29045;     Neuromuscular Micah:    0    mins  17260;    Therapeutic Activity:     8     mins  35600;     Gait Trainin     mins  15495;     Ultrasound:     0     mins  27057;    Electrical Stimulation:    0     mins  19492;  Iontophoresis     0     mins  47774    Untimed:  Electrical Stimulation:    0     mins  63019 ( );  Mechanical Traction:    0     mins  " 07541;   Fluidotherapy     0     mins  51965  Hot pack     0     mins  56747  Cold pack     0     mins  65770    Timed Treatment:   53   mins   Total Treatment:     53   mins        Neetu Salazar PTA  Physical Therapist Assistant

## 2022-07-20 ENCOUNTER — TREATMENT (OUTPATIENT)
Dept: PHYSICAL THERAPY | Facility: CLINIC | Age: 42
End: 2022-07-20

## 2022-07-20 DIAGNOSIS — R26.9 GAIT DISTURBANCE: ICD-10-CM

## 2022-07-20 DIAGNOSIS — Z98.890 S/P RIGHT KNEE SURGERY: Primary | ICD-10-CM

## 2022-07-20 DIAGNOSIS — M25.561 RIGHT KNEE PAIN, UNSPECIFIED CHRONICITY: ICD-10-CM

## 2022-07-20 PROCEDURE — 97140 MANUAL THERAPY 1/> REGIONS: CPT | Performed by: PHYSICAL THERAPIST

## 2022-07-20 PROCEDURE — 97530 THERAPEUTIC ACTIVITIES: CPT | Performed by: PHYSICAL THERAPIST

## 2022-07-20 PROCEDURE — 97110 THERAPEUTIC EXERCISES: CPT | Performed by: PHYSICAL THERAPIST

## 2022-07-20 NOTE — PROGRESS NOTES
"Physical Therapy Daily Treatment Note        Patient: Ophelia Velazco   : 1980  Diagnosis/ICD-10 Code:  S/P right knee surgery [Z98.890]  Referring practitioner: BILL Curry  Date of Initial Visit: Type: THERAPY  Noted: 2022  Today's Date: 2022  Patient seen for 6 sessions             Subjective   Ophelia Velazco reports having 3-4/10 pain in her right calf- she describes as \"Spasms\" .  She ambulated into clinic today with Single tip cane today.    Objective     Active Range of Motion   Left Knee   Flexion: 130 degrees   Extension: 0 degrees      Right Knee   Flexion: 125 degrees with  Mild pain  Extension: Right knee active extension: -5.   Extensor la degrees with pain     Strength Testing      Right Knee   Flexion: 4-/5- (\"Sore\"-\"Painful\")  Extension: 4-/5/5     Right Knee   Quadriceps contraction: poor+  Extensor Lag:  10-30 degrees during SLR performance  (Lag increases with fatigue)     See Exercise and Manual Logs for complete treatment.       Assessment/Plan     Pt tolerated therapy session well - with progression of therapeutic exercises, CKC-Functional activities, and Manual therapy. She has improved, but continues to have deficits in her Right Hip/Knee ROM,  Strength, and Stability; limiting function and ability to perform ADLs without pain or difficulty at this time.  Pt continues to respond well to Manual therapy- Soft tissue mobilization at medial hamstring insertion- reporting decrease in pain and tightness post session.  Good Tolerance to  gentle progression of increasing right LE weight bearing from 50% to WBAT as per MD instructions.  Pt instructed in ambulation with single tip cane - Gait improved with vcs and demonstration.       Progress per Plan of Care           Timed:  Manual Therapy:    10     mins  58355;  Therapeutic Exercise:    20     mins  73571;     Neuromuscular Micah:    0    mins  80746;    Therapeutic Activity:     8     mins  91157;     Gait " Trainin     mins  82357;     Ultrasound:     0     mins  22030;    Electrical Stimulation:    0     mins  93531;  Iontophoresis     0     mins  98868    Untimed:  Electrical Stimulation:    0     mins  02294 ( );  Mechanical Traction:    0     mins  23416;   Fluidotherapy     0     mins  19543  Hot pack     0     mins  23476  Cold pack     0     mins  46098    Timed Treatment:   38   mins   Total Treatment:     38   mins        Neetu Salazar PTA  Physical Therapist Assistant

## 2022-07-22 ENCOUNTER — OFFICE VISIT (OUTPATIENT)
Dept: ORTHOPEDIC SURGERY | Facility: CLINIC | Age: 42
End: 2022-07-22

## 2022-07-22 ENCOUNTER — TREATMENT (OUTPATIENT)
Dept: PHYSICAL THERAPY | Facility: CLINIC | Age: 42
End: 2022-07-22

## 2022-07-22 ENCOUNTER — DOCUMENTATION (OUTPATIENT)
Dept: PHYSICAL THERAPY | Facility: CLINIC | Age: 42
End: 2022-07-22

## 2022-07-22 VITALS — BODY MASS INDEX: 39.4 KG/M2 | WEIGHT: 260 LBS | HEIGHT: 68 IN

## 2022-07-22 DIAGNOSIS — M25.561 RIGHT KNEE PAIN, UNSPECIFIED CHRONICITY: Primary | ICD-10-CM

## 2022-07-22 DIAGNOSIS — M25.561 RIGHT KNEE PAIN, UNSPECIFIED CHRONICITY: ICD-10-CM

## 2022-07-22 DIAGNOSIS — Z98.890 S/P RIGHT KNEE SURGERY: Primary | ICD-10-CM

## 2022-07-22 DIAGNOSIS — R26.9 GAIT DISTURBANCE: ICD-10-CM

## 2022-07-22 DIAGNOSIS — Z47.89 ORTHOPEDIC AFTERCARE: ICD-10-CM

## 2022-07-22 PROCEDURE — 97530 THERAPEUTIC ACTIVITIES: CPT | Performed by: PHYSICAL THERAPIST

## 2022-07-22 PROCEDURE — 99213 OFFICE O/P EST LOW 20 MIN: CPT | Performed by: PHYSICIAN ASSISTANT

## 2022-07-22 PROCEDURE — 97140 MANUAL THERAPY 1/> REGIONS: CPT | Performed by: PHYSICAL THERAPIST

## 2022-07-22 PROCEDURE — 97110 THERAPEUTIC EXERCISES: CPT | Performed by: PHYSICAL THERAPIST

## 2022-07-22 NOTE — PROGRESS NOTES
"Chief Complaint  Pain and Follow-up of the Right Knee     Subjective      Ophelia Velazco presents to [facility] for follow up evaluation of the right knee. She is S/P follow-up of right knee s/p right tibial plateau ORIF performed on 04/26/2022 by Dr. Diaz. She has been wearing a knee brace. She has been attending physical therapy. She reports she feels like she has a constant muscle spasm. She has no other complaints.     No Known Allergies     Social History     Socioeconomic History   • Marital status:    Tobacco Use   • Smoking status: Current Every Day Smoker     Packs/day: 0.25     Years: 30.00     Pack years: 7.50     Types: Cigarettes   • Smokeless tobacco: Never Used   Vaping Use   • Vaping Use: Never used   Substance and Sexual Activity   • Alcohol use: Yes     Comment: socially   • Drug use: Never   • Sexual activity: Defer        Review of Systems     Objective   Vital Signs:   Ht 172.7 cm (68\")   Wt 118 kg (260 lb)   BMI 39.53 kg/m²       Physical Exam  Constitutional: Awake, alert   Integumentary: Warm, dry, intact   HENT: Atraumatic, normocephalic    Respiratory: Non labored respirations    Cardiovascular: Intact peripheral pulses    Psychiatric: Normal mood and affect. A&O x 3    Ortho Exam      Right knee- Positive EHL, FHL, GS and TA. Sensation intact to all 5 nerves of the foot. Positive pulses. Well healed scars. Neurovascularly intact. Stable to varus/valgus stress. Stable to anterior/posterior drawer. Negative Luke's. Negative Lachman's. ROM -5 to 95 degrees.       Procedures        Imaging Results (Most Recent)     Procedure Component Value Units Date/Time    XR Knee 3 View Right [[id number]] Resulted: 07/22/22 1555     Updated: 07/22/22 1555    Narrative:      X-Ray Report:  Study: X-rays ordered, taken in the office, and reviewed today  Site: right knee Xray  Indication: R tibial plateau fracture ORIF  View: AP, Lateral and [place] " view(s)  Findings: Intact right tibial plateau ORIF without evidence of hardware   failure or loosening.   Prior studies available for comparison: yes              Result Review :       XR Knee 3 View Right    Result Date: 7/22/2022  Narrative: X-Ray Report: Study: X-rays ordered, taken in the office, and reviewed today Site: right knee Xray Indication: R tibial plateau fracture ORIF View: AP, Lateral and [place] view(s) Findings: Intact right tibial plateau ORIF without evidence of hardware failure or loosening. Prior studies available for comparison: yes     XR Knee 3 View Right    Result Date: 6/27/2022  Narrative: X-Ray Report: Study: X-rays ordered, taken in the office, and reviewed today Site: right knee Xray Indication: ORIF tibial plateau View: AP, Lateral and [place] view(s) Findings: Hardware of ORIF of the lateral tibia plateau is intact without evidence of screw loosening or hardware failure. Prior studies available for comparison: yes              Assessment and Plan     Diagnoses and all orders for this visit:    1. Right knee pain, unspecified chronicity (Primary)  -     XR Knee 3 View Right    2. Aftercare following right tibial plateau ORIF        Discussed the treatment plan with the patient.  I reviewed the x-rays that were obtained today with the patient. Plan to continue physical therapy working on ROM and strengthening. I discussed with her about massaging her incisions to desensitize.     Call or return if worsening symptoms.    Follow Up     4 weeks with repeat x-rays      Patient was given instructions and counseling regarding her condition or for health maintenance advice. Please see specific information pulled into the AVS if appropriate.     Scribed for BILL Curry by Jovanna Spence.  07/22/22   15:29 EDT

## 2022-07-22 NOTE — PROGRESS NOTES
"  Physical Therapy Daily Treatment Note        Patient: Ophelia Velazco   : 1980  Diagnosis/ICD-10 Code:  S/P right knee surgery [Z98.890]  Referring practitioner: BILL Curry  Date of Initial Visit: Type: THERAPY  Noted: 2022  Today's Date: 2022  Patient seen for 7 sessions             Subjective   Ophelia Velazco reports that her right knee has been feeling better with less pain with improved function.  She has begun using Straight cane on the .  She rates her worst pain at 4-5/10 and best pain 0.    Objective     LEFS: 43/80=46% limitation     Right Knee   Flexion: 125 degrees with  Mild pain  Extension:  -5 - Lacking full extension      Strength Testing      Right Knee   Flexion: 4-/5- (\"Sore\"-\"Painful\")  Extension: 4-/5/     Right Knee   Quadriceps contraction: Fair -  Extensor Lag:  10-30 degrees during SLR performance  (Lag increases with fatigue)    See Exercise and Manual Logs for complete treatment.       Assessment & Plan     Assessment  Impairments: abnormal gait, abnormal or restricted ROM, activity intolerance, impaired balance, impaired physical strength, pain with function and weight-bearing intolerance  Functional Limitations: walking, uncomfortable because of pain, standing and unable to perform repetitive tasks  Assessment details: Pt is s/p: R knee tibial plateau ORIF (22) and presents with right knee pain , decreased ROM, decreased knee strength, and difficulty walking with decreased R weightbearing tolerance. Pt has significant difficulty with daily activities and is off of work due to injury. Pt will benefit from skilled PT to address functional deficits and return to PLOF.       Prognosis: good    Goals  Plan Goals: KNEE PROBLEMS:     1. The patient has limited ROM of the R knee.   LTG 1: 12 weeks:  The patient will demonstrate 0 to 135  degrees of ROM for the R knee in order to allow patient to complete prolonged walking, standing, stairs and other " ADLs with decreased pain/difficulty.    STATUS:  progressing   STG 1a:  6 weeks:  The patient will demonstrate 0 to 130 degrees of ROM for the R knee.    STATUS:  progressing    2. The patient has limited strength of the R knee.   LTG 2: 12 weeks: The patient will demonstrate 4+/5 strength for R knee flexion and extension in order to allow patient improved joint stability    STATUS:  Progressing   STG 2a: 6 weeks: The patient will demonstrate 4/5 strength for R knee flexion and extension    STATUS: Progressing   TREATMENT: Manual therapy, therapeutic exercise, home exercise instruction, aquatic therapy, and modalities as needed to include:  moist heat, electrical stimulation, ultrasound, and ice.     3. The patient has gait dysfunction.   LTG 3: 12 weeks:  The patient will ambulate without assistive device, independently, for community distances with minimal limp to the R lower extremity in order to improve mobility and allow patient to perform activities such as grocery shopping with greater ease.    STATUS: progressing   TREATMENT: Gait training, aquatic therapy, therapeutic exercise, and home exercise instruction.    4. Mobility: Walking/Moving Around Functional Limitation     LTG 4: 12 weeks:  The patient will demonstrate  LEFS score of 55 in order to decrease limitations.    STATUS:  progressing   STG 4 a: 6 weeks:  The patient will demonstrate  LEFS score of 40 in order to decrease limitations.    STATUS:  Met      Plan  Therapy options: will be seen for skilled therapy services  Planned modality interventions: cryotherapy, TENS and thermotherapy (hydrocollator packs)  Planned therapy interventions: balance/weight-bearing training, flexibility, functional ROM exercises, gait training, home exercise program, joint mobilization, manual therapy, neuromuscular re-education, soft tissue mobilization, strengthening, stretching and therapeutic activities  Frequency: 3x week  Duration in weeks: 8  Treatment plan  discussed with: patient         Pt has tolerated therapy sessions well - with progression of therapeutic exercises, CKC-Functional activities, and Manual therapy. She has improved, but continues to have deficits in her Right Hip/Knee ROM,  Strength, and Stability; limiting function and ability to perform all ADLs without pain or difficulty at this time.  She continues to respond well to Manual therapy- Soft tissue mobilization at medial hamstring insertion- reporting decrease in pain and tightness post sessions.  Good Tolerance to  right LE weight bearing progression from 50% to WBAT.  Pt instructed in ambulation with single tip cane - Gait has  improved with vcs and demonstration.  .   See Progress note to MD for follow-up with Orthopedic later today.    Progress per Plan of Care           Timed:  Manual Therapy:    10     mins  53897;  Therapeutic Exercise:    22     mins  37225;     Neuromuscular Micah:    0    mins  15709;    Therapeutic Activity:     8     mins  00007;     Gait Trainin     mins  69125;     Ultrasound:     0     mins  37491;    Electrical Stimulation:    0     mins  32682;  Iontophoresis     0     mins  64883    Untimed:  Electrical Stimulation:    0     mins  54945 ( );  Mechanical Traction:    0     mins  84594;   Fluidotherapy     0     mins  26067  Hot pack     0     mins  05490  Cold pack     0     mins  00423    Timed Treatment:   40   mins   Total Treatment:     40   mins        Neetu Salazar PTA  Physical Therapist Assistant  Celine Jean, PT  Physical Therapist

## 2022-07-22 NOTE — PROGRESS NOTES
"Physical Therapy Daily Treatment Note        Patient: Ophelia Velazco   : 1980  Diagnosis/ICD-10 Code:  S/P right knee surgery [Z98.890]  Referring practitioner: BILL Curry  Date of Initial Visit: Type: THERAPY  Noted: 2022  Today's Date: 2022  Patient seen for 7 sessions             Subjective   Ophelia Velazco reports that her right knee has been feeling better with less pain with improved function.  She has begun using Straight cane on the .  She rates her worst pain at 4-5/10 and best pain 0.    Objective          Right Knee   Flexion: 125 degrees with  Mild pain  Extension:  -5 - Lacking full extension      Strength Testing      Right Knee   Flexion: 4-/5- (\"Sore\"-\"Painful\")  Extension: 4-/5/     Right Knee   Quadriceps contraction: Fair -  Extensor Lag:  10-30 degrees during SLR performance  (Lag increases with fatigue)    See Exercise and Manual Logs for complete treatment.       Assessment/Plan     Pt has tolerated therapy sessions well - with progression of therapeutic exercises, CKC-Functional activities, and Manual therapy. She has improved, but continues to have deficits in her Right Hip/Knee ROM,  Strength, and Stability; limiting function and ability to perform all ADLs without pain or difficulty at this time.  She continues to respond well to Manual therapy- Soft tissue mobilization at medial hamstring insertion- reporting decrease in pain and tightness post sessions.  Good Tolerance to  right LE weight bearing progression from 50% to WBAT.  Pt instructed in ambulation with single tip cane - Gait has  improved with vcs and demonstration.  .   See Progress note to MD for follow-up with Orthopedic later today.    Progress per Plan of Care           Timed:  Manual Therapy:    10     mins  56458;  Therapeutic Exercise:    22     mins  89013;     Neuromuscular Micah:    0    mins  94069;    Therapeutic Activity:     8     mins  46388;     Gait Trainin     mins  " 00704;     Ultrasound:     0     mins  44654;    Electrical Stimulation:    0     mins  79452;  Iontophoresis     0     mins  77768    Untimed:  Electrical Stimulation:    0     mins  10594 ( );  Mechanical Traction:    0     mins  59238;   Fluidotherapy     0     mins  65008  Hot pack     0     mins  86639  Cold pack     0     mins  92390    Timed Treatment:   40   mins   Total Treatment:     40   mins        Neetu Salazar PTA  Physical Therapist Assistant

## 2022-08-04 ENCOUNTER — TREATMENT (OUTPATIENT)
Dept: PHYSICAL THERAPY | Facility: CLINIC | Age: 42
End: 2022-08-04

## 2022-08-04 DIAGNOSIS — Z98.890 S/P RIGHT KNEE SURGERY: Primary | ICD-10-CM

## 2022-08-04 DIAGNOSIS — R26.9 GAIT DISTURBANCE: ICD-10-CM

## 2022-08-04 DIAGNOSIS — M25.561 RIGHT KNEE PAIN, UNSPECIFIED CHRONICITY: ICD-10-CM

## 2022-08-04 PROCEDURE — 97110 THERAPEUTIC EXERCISES: CPT | Performed by: PHYSICAL THERAPIST

## 2022-08-04 PROCEDURE — 97140 MANUAL THERAPY 1/> REGIONS: CPT | Performed by: PHYSICAL THERAPIST

## 2022-08-04 PROCEDURE — 97530 THERAPEUTIC ACTIVITIES: CPT | Performed by: PHYSICAL THERAPIST

## 2022-08-04 NOTE — PROGRESS NOTES
"Physical Therapy Daily Treatment Note        Patient: Ophelia Velazco   : 1980  Diagnosis/ICD-10 Code:  S/P right knee surgery [Z98.890]  Referring practitioner: BILL Curry  Date of Initial Visit: Type: THERAPY  Noted: 2022  Today's Date: 2022  Patient seen for 8 sessions             Subjective   Ophelia Velazco reports that  Her knee is feeling much better.  She reports that she has been walking without her cane, but does report using her cane when she goes up/down stairs.    Objective     Active Range of Motion   Left Knee   Flexion: 122 degrees   Extension: 0 degrees      Right Knee   Flexion: 125 degrees with  Mild pain  Extension: Right knee active extension: -5.   Extensor lag: 10-15 degrees with pain     Strength Testing      Right Knee   Flexion: 4-/5- (\"Sore\"-\"Painful\")  Extension: 4-/5  (Visible shaking with MMT)     Right Knee   Quadriceps contraction:  Fair  Extensor Lag:  10-20 degrees during SLR performance  (Lag increases with fatigue)    See Exercise and Manual Logs for complete treatment.       Assessment/Plan     Pt tolerated therapy session well - with progression of therapeutic exercises, CKC-Functional activities, and Manual therapy. She has improved, but continues to have deficits in her Right Hip/Knee ROM,  Strength, and Stability; limiting function and ability to perform ADLs without pain or difficulty at this time.  Pt continues to respond well to Manual therapy- Soft tissue mobilization.   Good Tolerance to   progression of increasing right Lower Extremity to WBAT.         Progress per Plan of Care           Timed:  Manual Therapy:    10     mins  75411;  Therapeutic Exercise:    32     mins  05244;     Neuromuscular Micah:    0    mins  58377;    Therapeutic Activity:     12     mins  40274;     Gait Trainin     mins  25044;     Ultrasound:     0     mins  93586;    Electrical Stimulation:    0     mins  77797;  Iontophoresis     0     mins  " 30626    Untimed:  Electrical Stimulation:    0     mins  09132 ( );  Mechanical Traction:    0     mins  44110;   Fluidotherapy     0     mins  57709  Hot pack     0     mins  63400  Cold pack     0     mins  42692    Timed Treatment:   54   mins   Total Treatment:     54   mins        Neetu Salazar PTA  Physical Therapist Assistant

## 2022-08-09 ENCOUNTER — TREATMENT (OUTPATIENT)
Dept: PHYSICAL THERAPY | Facility: CLINIC | Age: 42
End: 2022-08-09

## 2022-08-09 DIAGNOSIS — Z98.890 S/P RIGHT KNEE SURGERY: Primary | ICD-10-CM

## 2022-08-09 DIAGNOSIS — R26.9 GAIT DISTURBANCE: ICD-10-CM

## 2022-08-09 DIAGNOSIS — M25.561 RIGHT KNEE PAIN, UNSPECIFIED CHRONICITY: ICD-10-CM

## 2022-08-09 PROCEDURE — 97140 MANUAL THERAPY 1/> REGIONS: CPT | Performed by: PHYSICAL THERAPIST

## 2022-08-09 PROCEDURE — 97530 THERAPEUTIC ACTIVITIES: CPT | Performed by: PHYSICAL THERAPIST

## 2022-08-09 PROCEDURE — 97110 THERAPEUTIC EXERCISES: CPT | Performed by: PHYSICAL THERAPIST

## 2022-08-09 NOTE — PROGRESS NOTES
"Physical Therapy Daily Treatment Note        Patient: Ophelia Velazco   : 1980  Diagnosis/ICD-10 Code:  S/P right knee surgery [Z98.890]  Referring practitioner: BILL Curry  Date of Initial Visit: Type: THERAPY  Noted: 2022  Today's Date: 2022  Patient seen for 9 sessions             Subjective   Ophelia Velazco reports having 5/10 pain in her right knee upon arrival.  She reports that she just feels very weak and tired today.      Objective     Strength Testing      Right Knee   Flexion: 4/5- (\"Sore\"-\"Painful\")  Extension: 4-/5  (Visible shaking with MMT)     Right Knee   Quadriceps contraction:  Fair  Extensor Lag:  10 degrees during SLR performance  (Lag increases with fatigue)    See Exercise and Manual Logs for complete treatment.       Assessment/Plan     Pt tolerated therapy session moderately well - with performance of therapeutic exercises, CKC-Functional activities, and Manual therapy. She has improved, but continues to have deficits in her Right Hip/Knee ROM,  Strength, and Stability; limiting function and ability to perform ADLs without pain or difficulty at this time.  Pt continues to respond well to Manual therapy- Soft tissue mobilization- reporting less pain and tightness post session.  Held some of CKC-Functional activities and aerobic activity secondary to pt reporting full body weakness and fatigue- visible shaking in Upper extremities, and excessive sweating noted.  Recommendation given for pt to contact PCP and may benefit from having lab work drawn.            Progress per Plan of Care           Timed:  Manual Therapy:    10     mins  44421;  Therapeutic Exercise:    20     mins  87080;     Neuromuscular Micah:    0    mins  16183;    Therapeutic Activity:     8     mins  14265;     Gait Trainin     mins  82986;     Ultrasound:     0     mins  12735;    Electrical Stimulation:    0     mins  68066;  Iontophoresis     0     mins  " 88554    Untimed:  Electrical Stimulation:    0     mins  35356 ( );  Mechanical Traction:    0     mins  26493;   Fluidotherapy     0     mins  58419  Hot pack     0     mins  54197  Cold pack     0     mins  80156    Timed Treatment:   38   mins   Total Treatment:     38   mins        Neetu Salazar PTA  Physical Therapist Assistant

## 2022-08-11 ENCOUNTER — TELEPHONE (OUTPATIENT)
Dept: PHYSICAL THERAPY | Facility: CLINIC | Age: 42
End: 2022-08-11

## 2022-08-16 ENCOUNTER — TREATMENT (OUTPATIENT)
Dept: PHYSICAL THERAPY | Facility: CLINIC | Age: 42
End: 2022-08-16

## 2022-08-16 DIAGNOSIS — Z98.890 S/P RIGHT KNEE SURGERY: Primary | ICD-10-CM

## 2022-08-16 DIAGNOSIS — R26.9 GAIT DISTURBANCE: ICD-10-CM

## 2022-08-16 DIAGNOSIS — M25.561 RIGHT KNEE PAIN, UNSPECIFIED CHRONICITY: ICD-10-CM

## 2022-08-16 PROCEDURE — 97530 THERAPEUTIC ACTIVITIES: CPT | Performed by: PHYSICAL THERAPIST

## 2022-08-16 PROCEDURE — 97140 MANUAL THERAPY 1/> REGIONS: CPT | Performed by: PHYSICAL THERAPIST

## 2022-08-16 PROCEDURE — 97110 THERAPEUTIC EXERCISES: CPT | Performed by: PHYSICAL THERAPIST

## 2022-08-16 NOTE — PROGRESS NOTES
Physical Therapy Daily Treatment Note        Patient: Ophelia Velazco   : 1980  Diagnosis/ICD-10 Code:  S/P right knee surgery [Z98.890]  Referring practitioner: BILL Curry  Date of Initial Visit: Type: THERAPY  Noted: 2022  Today's Date: 2022  Patient seen for 10 sessions             Subjective   Ophelia Velazco reports: no pain today.  Pt reports that her biggest concern is going down the stairs due to weakness at this time.     Objective   R knee AROM: 0-8-128  See Exercise, Manual, and Modality Logs for complete treatment.       Assessment/Plan  Patient tolerated all exercise progressions and manual therapy well today but continues to demonstrate R knee strength/ROM deficits limiting function.  Pt continues to demonstrate R quadriceps weakness, especially during eccentric lowering tasks. Continue to progress per patient tolerance.    Progress per Plan of Care           Timed:  Manual Therapy:    8     mins  38577;  Therapeutic Exercise:    20     mins  63452;     Neuromuscular Micah:    0    mins  27633;    Therapeutic Activity:     8     mins  55568;     Gait Trainin     mins  23961;     Ultrasound:     0     mins  12248;    Electrical Stimulation:    0     mins  10324;  Iontophoresis     0     mins  83111    Untimed:  Electrical Stimulation:    0     mins  38543 ( );  Mechanical Traction:    0     mins  26497;   Fluidotherapy     0     mins  18417  Hot pack     0     Mins    Cold pack                       0     Mins     Timed Treatment:   36   mins   Total Treatment:     45   mins        Shellie Gonzalez PT    Electronically signed [unfilled]  [place] License: [id number]  NPI number: [id number]

## 2022-08-17 RX ORDER — ALBUTEROL SULFATE 90 UG/1
AEROSOL, METERED RESPIRATORY (INHALATION)
Qty: 9 G | Refills: 0 | Status: SHIPPED | OUTPATIENT
Start: 2022-08-17 | End: 2022-09-15 | Stop reason: SDUPTHER

## 2022-08-18 ENCOUNTER — TREATMENT (OUTPATIENT)
Dept: PHYSICAL THERAPY | Facility: CLINIC | Age: 42
End: 2022-08-18

## 2022-08-18 DIAGNOSIS — Z98.890 S/P RIGHT KNEE SURGERY: Primary | ICD-10-CM

## 2022-08-18 DIAGNOSIS — M25.561 RIGHT KNEE PAIN, UNSPECIFIED CHRONICITY: ICD-10-CM

## 2022-08-18 DIAGNOSIS — R26.9 GAIT DISTURBANCE: ICD-10-CM

## 2022-08-18 PROCEDURE — 97110 THERAPEUTIC EXERCISES: CPT | Performed by: PHYSICAL THERAPIST

## 2022-08-18 PROCEDURE — 97140 MANUAL THERAPY 1/> REGIONS: CPT | Performed by: PHYSICAL THERAPIST

## 2022-08-18 NOTE — PROGRESS NOTES
Physical Therapy Progress Note      Patient: Ophelia Velazco   : 1980  Referring practitioner: BILL Curry  Date of Initial Visit: Type: THERAPY  Noted: 2022  Today's Date: 2022  Patient seen for 11 sessions           Subjective   Ophelia Velazco reports: right knee pain 2/10.    Subjective Questionnaire: LEFS: 53/80=34% limitation improved from prior score 43/80=46% limitation      Objective          Active Range of Motion     Right Knee   Flexion: 128 degrees   Extensor la degrees     Additional Active Range of Motion Details  Patient unable to achieve neutral right knee extension secondary to pain.    Strength/Myotome Testing     Right Knee   Flexion: 4  Extension: 4    Left Knee Flexibility Comments:   Left hamstring tightness      See Exercise, Manual, and Modality Logs for complete treatment.       Assessment & Plan     Assessment  Impairments: abnormal gait, abnormal or restricted ROM, activity intolerance, impaired balance, impaired physical strength, pain with function and weight-bearing intolerance  Functional Limitations: walking, uncomfortable because of pain, standing and unable to perform repetitive tasks  Assessment details: Pt is s/p: R knee tibial plateau ORIF (22) and presents with right knee pain , decreased ability to extend right knee fully, decreased knee strength, and difficulty stair negotiation/prolonged walking tolerance. Pt has difficulty with daily activities and is off of work due to injury. Pt will benefit from skilled PT to address functional deficits and return to PLOF.       Prognosis: good    Goals  Plan Goals: KNEE PROBLEMS:     1. The patient has limited ROM of the R knee.   LTG 1: 12 weeks:  The patient will demonstrate 0 to 135  degrees of ROM for the R knee in order to allow patient to complete prolonged walking, standing, stairs and other ADLs with decreased pain/difficulty.    STATUS:  progressing   STG 1a:  6 weeks:  The patient will  demonstrate 0 to 130 degrees of ROM for the R knee.    STATUS:  progressing    2. The patient has limited strength of the R knee.   LTG 2: 12 weeks: The patient will demonstrate 4+/5 strength for R knee flexion and extension in order to allow patient improved joint stability    STATUS:  Progressing   STG 2a: 6 weeks: The patient will demonstrate 4/5 strength for R knee flexion and extension    STATUS: Met   TREATMENT: Manual therapy, therapeutic exercise, home exercise instruction, aquatic therapy, and modalities as needed to include:  moist heat, electrical stimulation, ultrasound, and ice.     3. The patient has gait dysfunction.   LTG 3: 12 weeks:  The patient will ambulate without assistive device, independently, for community distances with minimal limp to the R lower extremity in order to improve mobility and allow patient to perform activities such as grocery shopping with greater ease.    STATUS: Met   TREATMENT: Gait training, aquatic therapy, therapeutic exercise, and home exercise instruction.    4. Mobility: Walking/Moving Around Functional Limitation     LTG 4: 12 weeks:  The patient will demonstrate  LEFS score of 55 in order to decrease limitations.    STATUS:  progressing   STG 4 a: 6 weeks:  The patient will demonstrate  LEFS score of 40 in order to decrease limitations.    STATUS:  Met      Plan  Therapy options: will be seen for skilled therapy services  Planned modality interventions: cryotherapy, TENS and thermotherapy (hydrocollator packs)  Planned therapy interventions: balance/weight-bearing training, flexibility, functional ROM exercises, gait training, home exercise program, joint mobilization, manual therapy, neuromuscular re-education, soft tissue mobilization, strengthening, stretching and therapeutic activities  Frequency: 2x week  Duration in weeks: 4  Treatment plan discussed with: patient        Visit Diagnoses:    ICD-10-CM ICD-9-CM   1. S/P right knee surgery  Z98.890 V45.89   2.  Gait disturbance  R26.9 781.2   3. Right knee pain, unspecified chronicity  M25.561 719.46       Progress strengthening /stabilization /functional activity           Timed:  Manual Therapy:    12     mins  61355;  Therapeutic Exercise:    20     mins  44805;     Neuromuscular Micah:        mins  56750;    Therapeutic Activity:     6    mins  49824;     Gait Training:           mins  10756;     Ultrasound:          mins  69351;    Electrical Stimulation:         mins  68000 ( );    Untimed:  Electrical Stimulation:         mins  49924 ( );  Mechanical Traction:         mins  14673;     Timed Treatment: 38     mins   Total Treatment:     38   mins  Celine Jean PT    Electronically singed 8/18/2022      [place] PT license: [id number]  Physical Therapist

## 2022-08-23 ENCOUNTER — TREATMENT (OUTPATIENT)
Dept: PHYSICAL THERAPY | Facility: CLINIC | Age: 42
End: 2022-08-23

## 2022-08-23 DIAGNOSIS — R26.9 GAIT DISTURBANCE: ICD-10-CM

## 2022-08-23 DIAGNOSIS — Z98.890 S/P RIGHT KNEE SURGERY: Primary | ICD-10-CM

## 2022-08-23 DIAGNOSIS — M25.561 RIGHT KNEE PAIN, UNSPECIFIED CHRONICITY: ICD-10-CM

## 2022-08-23 PROCEDURE — 97110 THERAPEUTIC EXERCISES: CPT | Performed by: PHYSICAL THERAPIST

## 2022-08-23 PROCEDURE — 97530 THERAPEUTIC ACTIVITIES: CPT | Performed by: PHYSICAL THERAPIST

## 2022-08-23 PROCEDURE — 97140 MANUAL THERAPY 1/> REGIONS: CPT | Performed by: PHYSICAL THERAPIST

## 2022-08-23 NOTE — PROGRESS NOTES
"Physical Therapy Daily Treatment Note        Patient: Ophelia Velazco   : 1980  Diagnosis/ICD-10 Code:  S/P right knee surgery [Z98.890]  Referring practitioner: BILL Curry  Date of Initial Visit: Type: THERAPY  Noted: 2022  Today's Date: 2022  Patient seen for 12 sessions             Subjective   Ophelia Velazco denies having any pain upon arrival - primarily reports having more \"Stiffness\" today.  She reports having a good day yesterday- doing more- going up stairs.  She does continue to report having greater weakness and difficulty going down her stairs.    Objective     + Tightness with tenderness to palpation - Posterior knee and along Medial hamstring musculature   + Edema noted right knee    See Exercise and Manual Logs for complete treatment.       Assessment/Plan     Pt tolerated therapy session moderately well today- with performance of therapeutic exercises, CKC-Functional activities, and Manual therapy. She has improved, but continues to have deficits in her Right Hip/Knee ROM,  Strength, and Stability; limiting function and ability to perform all ADLs without pain or difficulty at this time.  She continues to respond well to Manual therapy- Soft tissue mobilization- reporting less pain and tightness post session.  Moderate functional weakness persists as noted with performance of Step ups - with pt reporting feeling as if her right knee was going to \"Buckle\".          Progress per Plan of Care- as tolerated           Timed:  Manual Therapy:    15     mins  23293;  Therapeutic Exercise:    28     mins  75870;     Neuromuscular Micah:    0    mins  63213;    Therapeutic Activity:     10     mins  24959;     Gait Trainin     mins  71927;     Ultrasound:     0     mins  43635;    Electrical Stimulation:    0     mins  66671;  Iontophoresis     0     mins  80398    Untimed:  Electrical Stimulation:    0     mins  93725 ( );  Mechanical Traction:    0     mins  " 31906;   Fluidotherapy     0     mins  02828  Hot pack     0     mins  19957  Cold pack     0     mins  73614    Timed Treatment:   53   mins   Total Treatment:     53   mins        Neetu Salazar PTA  Physical Therapist Assistant

## 2022-08-25 ENCOUNTER — TREATMENT (OUTPATIENT)
Dept: PHYSICAL THERAPY | Facility: CLINIC | Age: 42
End: 2022-08-25

## 2022-08-25 DIAGNOSIS — M25.561 RIGHT KNEE PAIN, UNSPECIFIED CHRONICITY: ICD-10-CM

## 2022-08-25 DIAGNOSIS — Z98.890 S/P RIGHT KNEE SURGERY: Primary | ICD-10-CM

## 2022-08-25 DIAGNOSIS — R26.9 GAIT DISTURBANCE: ICD-10-CM

## 2022-08-25 PROCEDURE — 97014 ELECTRIC STIMULATION THERAPY: CPT | Performed by: PHYSICAL THERAPIST

## 2022-08-25 PROCEDURE — 97140 MANUAL THERAPY 1/> REGIONS: CPT | Performed by: PHYSICAL THERAPIST

## 2022-08-25 NOTE — PROGRESS NOTES
Physical Therapy Daily Treatment Note      Patient: Ophelia Velazco   : 1980  Referring practitioner: BILL Curry  Date of Initial Visit: Type: THERAPY  Noted: 2022  Today's Date: 2022  Patient seen for 13 sessions           Subjective   Ophelia Velazco reports: right posterior knee pain 8/10      Objective   See Exercise, Manual, and Modality Logs for complete treatment.       Assessment & Plan     Assessment    Assessment details: Patient reports feeling much better after modalities today.         Visit Diagnoses:    ICD-10-CM ICD-9-CM   1. S/P right knee surgery  Z98.890 V45.89   2. Right knee pain, unspecified chronicity  M25.561 719.46   3. Gait disturbance  R26.9 781.2       Progress per Plan of Care           Timed:  Manual Therapy:    8     mins  06833;  Therapeutic Exercise:    4     mins  28813;     Neuromuscular Micah:        mins  68094;    Therapeutic Activity:          mins  74944;     Gait Training:           mins  37764;     Ultrasound:          mins  85767;    Electrical Stimulation:         mins  62055 ( );    Untimed:  Electrical Stimulation:    15     mins  99103 ( );  Mechanical Traction:         mins  91172;     Timed Treatment:   12   mins   Total Treatment:     27   mins  Celine Jean PT    Electronically singed 2022      [place] PT license: [id number]  Physical Therapist

## 2022-08-29 ENCOUNTER — OFFICE VISIT (OUTPATIENT)
Dept: ORTHOPEDIC SURGERY | Facility: CLINIC | Age: 42
End: 2022-08-29

## 2022-08-29 VITALS — BODY MASS INDEX: 39.4 KG/M2 | HEART RATE: 81 BPM | OXYGEN SATURATION: 100 % | HEIGHT: 68 IN | WEIGHT: 260 LBS

## 2022-08-29 DIAGNOSIS — M25.561 RIGHT KNEE PAIN, UNSPECIFIED CHRONICITY: Primary | ICD-10-CM

## 2022-08-29 DIAGNOSIS — Z47.89 ORTHOPEDIC AFTERCARE: ICD-10-CM

## 2022-08-29 PROCEDURE — 99213 OFFICE O/P EST LOW 20 MIN: CPT | Performed by: PHYSICIAN ASSISTANT

## 2022-08-29 NOTE — PROGRESS NOTES
"Chief Complaint  Pain and Follow-up of the Right Knee    Subjective      Ophelia Velazco presents to [facility] for follow-up of right tibial plateau ORIF performed on 4/26/2022 by Dr. Diaz.  She reports she is overall improved.  She has been participating [place] [facility] [place].  Does states she has had \"some good days and some bad days\".  Reports that she has had some significant stiffness after working with physical therapy, although notes relief with use of TENS unit.  Does describe particular difficulties with going downstairs, describing feeling as if \"my leg is going to give out from underneath me\".  She does report that she has home exercises and that PT has been focusing on strengthening exercises as well.    Objective   No Known Allergies    Vital Signs:   Pulse 81   Ht 172.7 cm (68\")   Wt 118 kg (260 lb)   SpO2 100%   BMI 39.53 kg/m²       Physical Exam    Constitutional: Awake, alert. Well nourished appearance.    Integumentary: Warm, dry, intact. No obvious rashes.    HENT: Atraumatic, normocephalic.   Respiratory: Non labored respirations .   Cardiovascular: Intact peripheral pulses.    Psychiatric: Normal mood and affect. A&O X3    Ortho Exam  Right knee: Surgical scars are well-healed.  No tenderness overlying surgical incisions.  -5 degrees of knee extension.  Knee flexion to 115 degrees.  Patella is well tracking.  Knee is stable to varus and valgus stress.  Sensation intact to light touch.  Distal neurovascular intact.  Nonantalgic gait noted.    Imaging Results (Most Recent)     Procedure Component Value Units Date/Time    XR Knee 3 View Right [[id number]] Resulted: 08/29/22 1056     Updated: 08/29/22 1057    Narrative:      X-Ray Report:  Study: X-rays ordered, taken in the office, and reviewed today.   Site: Right knee Xray  Indication: R tibial plateau ORIF  View: AP, Lateral and Sunrise view(s)  Findings: Intact right " tibial plateau ORIF without evidence of hardware   malfunction or loosening.  Good bony healing noted.  Prior studies available for comparison: yes               Assessment and Plan   Problem List Items Addressed This Visit        Musculoskeletal and Injuries    Aftercare following right tibial plateau ORIF    Relevant Orders    Ambulatory Referral to Physical Therapy Evaluate and treat, POST OP; Stretching, ROM, Strengthening      Other Visit Diagnoses     Right knee pain, unspecified chronicity    -  Primary    Relevant Orders    XR Knee 3 View Right (Completed)        Follow Up   Return in about 6 weeks (around 10/10/2022).    Patient Instructions   X-rays taken and reviewed today.     Updated referral sent to PT today. Patient to continue progressing strengthening in particular. Continue home exercises.     Work note provided for patient to remain off until re-eval.     Follow-up in 6-8 weeks. Repeat x-rays.     Patient was given instructions and counseling regarding her condition or for health maintenance advice. Please see specific information pulled into the AVS if appropriate.

## 2022-08-29 NOTE — PATIENT INSTRUCTIONS
X-rays taken and reviewed today.     Updated referral sent to PT today. Patient to continue progressing strengthening in particular. Continue home exercises.     Work note provided for patient to remain off until re-eval.     Follow-up in 6-8 weeks. Repeat x-rays.

## 2022-08-30 ENCOUNTER — TREATMENT (OUTPATIENT)
Dept: PHYSICAL THERAPY | Facility: CLINIC | Age: 42
End: 2022-08-30

## 2022-08-30 DIAGNOSIS — R26.9 GAIT DISTURBANCE: ICD-10-CM

## 2022-08-30 DIAGNOSIS — Z98.890 S/P RIGHT KNEE SURGERY: Primary | ICD-10-CM

## 2022-08-30 DIAGNOSIS — M25.561 RIGHT KNEE PAIN, UNSPECIFIED CHRONICITY: ICD-10-CM

## 2022-08-30 PROCEDURE — 97530 THERAPEUTIC ACTIVITIES: CPT | Performed by: PHYSICAL THERAPIST

## 2022-08-30 PROCEDURE — 97140 MANUAL THERAPY 1/> REGIONS: CPT | Performed by: PHYSICAL THERAPIST

## 2022-08-30 PROCEDURE — 97110 THERAPEUTIC EXERCISES: CPT | Performed by: PHYSICAL THERAPIST

## 2022-08-30 NOTE — PROGRESS NOTES
"Physical Therapy Daily Treatment Note        Patient: Ophelia Velazco   : 1980  Diagnosis/ICD-10 Code:  S/P right knee surgery [Z98.890]  Referring practitioner: BILL Curry  Date of Initial Visit: Type: THERAPY  Noted: 2022  Today's Date: 2022  Patient seen for 14 sessions             Subjective   Ophelia Velazco reports that she feels much better today- reporting less pain and stiffness.  She reports that she has ordered a TENs unit for use at home after trial in clinic last session.  She reports that she had follow-up with her Orthopedic and said \"Xray looked good\".  She reports that she was her to continue with Physical Therapy to work on strengthening.  She reports that she continues to have her off work for 8 more weeks until next Ortho follow-up.     Objective       Decreased Eccentric Quad strength- VMO weakness    See Exercise and Manual Logs for complete treatment.       Assessment/Plan     Pt tolerated therapy session  well today- with progression of therapeutic exercises, CKC-Functional activities, and Manual therapy. She has improved, but continues to have deficits in her Right Hip/Knee Strength, and Stability; limiting function and ability to perform all ADLs without pain or difficulty at this time.  She continues to respond well to Manual therapy- Soft tissue mobilization- reporting less pain and tightness post session.  Moderate functional weakness of Left knee persists  with pt reports at times feeling as if her right knee may \"Buckle\".  Visible weakness and decreased quad control noted during functional sit to stands and Step ups.       Progress per Plan of Care           Timed:  Manual Therapy:    10     mins  81284;  Therapeutic Exercise:    25     mins  67671;     Neuromuscular Micah:    0    mins  28955;    Therapeutic Activity:     10     mins  20138;     Gait Trainin     mins  26640;     Ultrasound:     0     mins  37052;    Electrical Stimulation:    0  "    mins  41389;  Iontophoresis     0     mins  93490    Untimed:  Electrical Stimulation:    0     mins  02415 ( );  Mechanical Traction:    0     mins  08513;   Fluidotherapy     0     mins  99452  Hot pack     0     mins  59508  Cold pack     0     mins  76371    Timed Treatment:   45   mins   Total Treatment:     45   mins        Neetu Salazar PTA  Physical Therapist Assistant

## 2022-09-01 ENCOUNTER — TREATMENT (OUTPATIENT)
Dept: PHYSICAL THERAPY | Facility: CLINIC | Age: 42
End: 2022-09-01

## 2022-09-01 DIAGNOSIS — Z98.890 S/P RIGHT KNEE SURGERY: Primary | ICD-10-CM

## 2022-09-01 DIAGNOSIS — M25.561 RIGHT KNEE PAIN, UNSPECIFIED CHRONICITY: ICD-10-CM

## 2022-09-01 DIAGNOSIS — R26.9 GAIT DISTURBANCE: ICD-10-CM

## 2022-09-01 PROCEDURE — 97110 THERAPEUTIC EXERCISES: CPT | Performed by: PHYSICAL THERAPIST

## 2022-09-01 PROCEDURE — 97140 MANUAL THERAPY 1/> REGIONS: CPT | Performed by: PHYSICAL THERAPIST

## 2022-09-01 NOTE — PROGRESS NOTES
"Physical Therapy Daily Treatment Note      Patient: Ophelia Velazco   : 1980  Referring practitioner: BILL Curry  Date of Initial Visit: Type: THERAPY  Noted: 2022  Today's Date: 2022  Patient seen for 15 sessions           Subjective   Ophelia Velazco reports: using TENS unit at home and right knee feeling better.      Objective   See Exercise, Manual, and Modality Logs for complete treatment.       Assessment & Plan     Assessment    Assessment details: Patient tolerated progression of right leg strengthening today; still noted \"shaking\" with right knee exercises.         Visit Diagnoses:    ICD-10-CM ICD-9-CM   1. S/P right knee surgery  Z98.890 V45.89   2. Right knee pain, unspecified chronicity  M25.561 719.46   3. Gait disturbance  R26.9 781.2       Progress strengthening /stabilization /functional activity           Timed:  Manual Therapy:    9     mins  62217;  Therapeutic Exercise:    31     mins  69768;     Neuromuscular Micah:        mins  01654;    Therapeutic Activity:          mins  49880;     Gait Training:           mins  82038;     Ultrasound:          mins  52464;    Electrical Stimulation:         mins  41166 ( );    Untimed:  Electrical Stimulation:         mins  92385 ( );  Mechanical Traction:         mins  69244;     Timed Treatment:   40   mins   Total Treatment:     40   mins  Celine Jean PT    Electronically singed 2022      [place] PT license: [id number]  Physical Therapist  "

## 2022-09-06 ENCOUNTER — TREATMENT (OUTPATIENT)
Dept: PHYSICAL THERAPY | Facility: CLINIC | Age: 42
End: 2022-09-06

## 2022-09-06 DIAGNOSIS — M25.561 RIGHT KNEE PAIN, UNSPECIFIED CHRONICITY: ICD-10-CM

## 2022-09-06 DIAGNOSIS — Z98.890 S/P RIGHT KNEE SURGERY: Primary | ICD-10-CM

## 2022-09-06 DIAGNOSIS — R26.9 GAIT DISTURBANCE: ICD-10-CM

## 2022-09-06 PROCEDURE — 97110 THERAPEUTIC EXERCISES: CPT | Performed by: PHYSICAL THERAPIST

## 2022-09-06 PROCEDURE — 97140 MANUAL THERAPY 1/> REGIONS: CPT | Performed by: PHYSICAL THERAPIST

## 2022-09-06 NOTE — PROGRESS NOTES
Physical Therapy Daily Treatment Note      Patient: Ophelia Velazco   : 1980  Referring practitioner: BILL Curry  Date of Initial Visit: Type: THERAPY  Noted: 2022  Today's Date: 2022  Patient seen for 16 sessions           Subjective   Ophelia Velazco reports: Patient reports right leg soreness; 3/10 pain rating.       Objective   See Exercise, Manual, and Modality Logs for complete treatment.       Assessment & Plan     Assessment    Assessment details: Patient reports feeling better after working on gluteal and hamstring strengthening today.         Visit Diagnoses:    ICD-10-CM ICD-9-CM   1. S/P right knee surgery  Z98.890 V45.89   2. Right knee pain, unspecified chronicity  M25.561 719.46   3. Gait disturbance  R26.9 781.2       Progress strengthening /stabilization /functional activity           Timed:  Manual Therapy:    10     mins  10916;  Therapeutic Exercise:    30     mins  08097;     Neuromuscular Micah:        mins  26700;    Therapeutic Activity:          mins  65570;     Gait Training:           mins  04087;     Ultrasound:          mins  59482;    Electrical Stimulation:         mins  36921 ( );    Untimed:  Electrical Stimulation:         mins  22588 ( );  Mechanical Traction:         mins  43357;     Timed Treatment:   40   mins   Total Treatment:     40   mins  Celine Jean PT    Electronically singed 2022      [place] PT license: [id number]  Physical Therapist

## 2022-09-08 ENCOUNTER — TREATMENT (OUTPATIENT)
Dept: PHYSICAL THERAPY | Facility: CLINIC | Age: 42
End: 2022-09-08

## 2022-09-08 DIAGNOSIS — M25.561 RIGHT KNEE PAIN, UNSPECIFIED CHRONICITY: ICD-10-CM

## 2022-09-08 DIAGNOSIS — Z98.890 S/P RIGHT KNEE SURGERY: Primary | ICD-10-CM

## 2022-09-08 DIAGNOSIS — R26.9 GAIT DISTURBANCE: ICD-10-CM

## 2022-09-08 PROCEDURE — 97110 THERAPEUTIC EXERCISES: CPT | Performed by: PHYSICAL THERAPIST

## 2022-09-08 PROCEDURE — 97140 MANUAL THERAPY 1/> REGIONS: CPT | Performed by: PHYSICAL THERAPIST

## 2022-09-08 NOTE — PROGRESS NOTES
Physical Therapy Daily Treatment Note      Patient: Ophelia Velazco   : 1980  Referring practitioner: BILL Curry  Date of Initial Visit: Type: THERAPY  Noted: 2022  Today's Date: 2022  Patient seen for 17 sessions           Subjective   Ophelia Velazco reports: not having much pain today      Objective   See Exercise, Manual, and Modality Logs for complete treatment.       Assessment & Plan     Assessment    Assessment details: Patient tolerated progression of right leg strengthening today without increase symptoms.         Visit Diagnoses:    ICD-10-CM ICD-9-CM   1. S/P right knee surgery  Z98.890 V45.89   2. Right knee pain, unspecified chronicity  M25.561 719.46   3. Gait disturbance  R26.9 781.2       Progress per Plan of Care           Timed:  Manual Therapy:    8     mins  69111;  Therapeutic Exercise:    30     mins  37558;     Neuromuscular Micah:        mins  93613;    Therapeutic Activity:          mins  64710;     Gait Training:           mins  07862;     Ultrasound:          mins  74996;    Electrical Stimulation:         mins  16919 ( );    Untimed:  Electrical Stimulation:         mins  66214 ( );  Mechanical Traction:         mins  14248;     Timed Treatment:   38   mins   Total Treatment:     38   mins  Celine Jean PT    Electronically singed 2022      [place] PT license: [id number]  Physical Therapist

## 2022-09-12 ENCOUNTER — OFFICE VISIT (OUTPATIENT)
Dept: INTERNAL MEDICINE | Facility: CLINIC | Age: 42
End: 2022-09-12

## 2022-09-12 VITALS
DIASTOLIC BLOOD PRESSURE: 93 MMHG | HEART RATE: 109 BPM | SYSTOLIC BLOOD PRESSURE: 144 MMHG | HEIGHT: 68 IN | WEIGHT: 271.2 LBS | TEMPERATURE: 98.2 F | OXYGEN SATURATION: 95 % | BODY MASS INDEX: 41.1 KG/M2

## 2022-09-12 DIAGNOSIS — F33.1 MAJOR DEPRESSIVE DISORDER, RECURRENT, MODERATE: ICD-10-CM

## 2022-09-12 DIAGNOSIS — Z90.3 S/P GASTRIC SLEEVE PROCEDURE: ICD-10-CM

## 2022-09-12 DIAGNOSIS — F41.1 ANXIETY, GENERALIZED: ICD-10-CM

## 2022-09-12 DIAGNOSIS — E66.01 MORBID (SEVERE) OBESITY DUE TO EXCESS CALORIES: ICD-10-CM

## 2022-09-12 DIAGNOSIS — E55.9 VITAMIN D DEFICIENCY: Primary | ICD-10-CM

## 2022-09-12 DIAGNOSIS — E78.2 MIXED HYPERLIPIDEMIA: ICD-10-CM

## 2022-09-12 DIAGNOSIS — Z47.89 ORTHOPEDIC AFTERCARE: ICD-10-CM

## 2022-09-12 DIAGNOSIS — R73.01 IFG (IMPAIRED FASTING GLUCOSE): ICD-10-CM

## 2022-09-12 DIAGNOSIS — J45.901 MODERATE ASTHMA WITH ACUTE EXACERBATION, UNSPECIFIED WHETHER PERSISTENT: ICD-10-CM

## 2022-09-12 PROCEDURE — 99214 OFFICE O/P EST MOD 30 MIN: CPT | Performed by: INTERNAL MEDICINE

## 2022-09-12 RX ORDER — CYANOCOBALAMIN 1000 UG/ML
1000 INJECTION, SOLUTION INTRAMUSCULAR; SUBCUTANEOUS
Status: SHIPPED | OUTPATIENT
Start: 2022-09-12

## 2022-09-12 RX ORDER — LORAZEPAM 0.5 MG/1
0.5 TABLET ORAL EVERY 8 HOURS PRN
Qty: 45 TABLET | Refills: 1 | Status: SHIPPED | OUTPATIENT
Start: 2022-09-12
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
## 2022-09-13 ENCOUNTER — TREATMENT (OUTPATIENT)
Dept: PHYSICAL THERAPY | Facility: CLINIC | Age: 42
End: 2022-09-13

## 2022-09-13 DIAGNOSIS — M25.561 RIGHT KNEE PAIN, UNSPECIFIED CHRONICITY: ICD-10-CM

## 2022-09-13 DIAGNOSIS — Z98.890 S/P RIGHT KNEE SURGERY: Primary | ICD-10-CM

## 2022-09-13 DIAGNOSIS — R26.9 GAIT DISTURBANCE: ICD-10-CM

## 2022-09-13 PROCEDURE — 97110 THERAPEUTIC EXERCISES: CPT | Performed by: PHYSICAL THERAPIST

## 2022-09-13 PROCEDURE — 97530 THERAPEUTIC ACTIVITIES: CPT | Performed by: PHYSICAL THERAPIST

## 2022-09-13 PROCEDURE — 97140 MANUAL THERAPY 1/> REGIONS: CPT | Performed by: PHYSICAL THERAPIST

## 2022-09-13 NOTE — PROGRESS NOTES
"Physical Therapy Daily Treatment Note        Patient: Ophelia Velazco   : 1980  Diagnosis/ICD-10 Code:  S/P right knee surgery [Z98.890]  Referring practitioner: BILL Curry  Date of Initial Visit: Type: THERAPY  Noted: 2022  Today's Date: 2022  Patient seen for 18 sessions             Subjective   Ophelia Velazco reports that her right knee feels more \"Stiff\" and \"Swollen\" today.  She rates her pain at 2/10 upon arrival today.  She reports that she will have a \"Good day\" and a \"Bad day\".    Objective     + Tightness with tenderness to palpation - Posterior knee and along Medial hamstring musculature   + Edema noted right knee       See Exercise and Manual Logs for complete treatment.       Assessment/Plan     Pt tolerated therapy session moderately well today- with performance of therapeutic exercises, CKC-Functional activities, and Manual therapy. She has improved, but continues to have deficits in her Right Hip/Knee ROM,  Strength, and Stability; limiting function and ability to perform all ADLs without pain or difficulty at this time.  She continues to respond well to Manual therapy- Soft tissue mobilization- reporting less pain and tightness post session.  Moderate functional weakness persists as noted with performance of Step ups - with pt reporting feeling as if her right knee was going to \"Buckle\".  She continues to exhibit decreased Quad/VMO strength - with visible shaking noted with eccentric movement during SAQ/LAQ performance.    Progress per Plan of Care           Timed:  Manual Therapy:    10     mins  39839;  Therapeutic Exercise:    23     mins  02683;     Neuromuscular Micah:    0    mins  23459;    Therapeutic Activity:     8     mins  62157;     Gait Trainin     mins  00055;     Ultrasound:     0     mins  62681;    Electrical Stimulation:    0     mins  13356;  Iontophoresis     0     mins  55951    Untimed:  Electrical Stimulation:    0     mins  79518 ( " );  Mechanical Traction:    0     mins  90026;   Fluidotherapy     0     mins  29342  Hot pack     0     mins  61146  Cold pack     0     mins  94130    Timed Treatment:   41   mins   Total Treatment:     41   mins        Neetu Salazar PTA  Physical Therapist Assistant
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
## 2022-09-15 ENCOUNTER — TREATMENT (OUTPATIENT)
Dept: PHYSICAL THERAPY | Facility: CLINIC | Age: 42
End: 2022-09-15

## 2022-09-15 DIAGNOSIS — Z98.890 S/P RIGHT KNEE SURGERY: Primary | ICD-10-CM

## 2022-09-15 DIAGNOSIS — M25.561 RIGHT KNEE PAIN, UNSPECIFIED CHRONICITY: ICD-10-CM

## 2022-09-15 DIAGNOSIS — R26.9 GAIT DISTURBANCE: ICD-10-CM

## 2022-09-15 PROCEDURE — 97140 MANUAL THERAPY 1/> REGIONS: CPT | Performed by: PHYSICAL THERAPIST

## 2022-09-15 PROCEDURE — 97110 THERAPEUTIC EXERCISES: CPT | Performed by: PHYSICAL THERAPIST

## 2022-09-15 RX ORDER — ALBUTEROL SULFATE 90 UG/1
AEROSOL, METERED RESPIRATORY (INHALATION)
Qty: 9 G | Refills: 0 | Status: SHIPPED | OUTPATIENT
Start: 2022-09-15 | End: 2022-10-19

## 2022-09-15 NOTE — PROGRESS NOTES
Physical Therapy Daily Treatment Note      Patient: Ophelia Velazco   : 1980  Referring practitioner: BILL Curry  Date of Initial Visit: Type: THERAPY  Noted: 2022  Today's Date: 9/15/2022  Patient seen for 19 sessions           Subjective   Ophelia Velazco reports: right knee pain better than last session; and agreeable to exercises      Objective   See Exercise, Manual, and Modality Logs for complete treatment.       Assessment & Plan     Assessment  Prognosis details: Patient's right knee pain still limiting her tolerance to exercise and functional mobility.        Visit Diagnoses:    ICD-10-CM ICD-9-CM   1. S/P right knee surgery  Z98.890 V45.89   2. Right knee pain, unspecified chronicity  M25.561 719.46   3. Gait disturbance  R26.9 781.2       Progress per Plan of Care           Timed:  Manual Therapy:    8     mins  03357;  Therapeutic Exercise:    20     mins  10347;     Neuromuscular Micah:        mins  91503;    Therapeutic Activity:          mins  99685;     Gait Training:           mins  95695;     Ultrasound:          mins  32333;    Electrical Stimulation:         mins  12533 ( );    Untimed:  Electrical Stimulation:         mins  58925 ( );  Mechanical Traction:         mins  85252;     Timed Treatment:   28   mins   Total Treatment:     28   mins  Celine Jean PT    Electronically singed 9/15/2022      [place] PT license: [id number]  Physical Therapist

## 2022-09-15 NOTE — TELEPHONE ENCOUNTER
Caller: Ophelia Velazco    Relationship: Self    Best call back number: [phone]    Requested Prescriptions:   Requested Prescriptions     Pending Prescriptions Disp Refills   • albuterol sulfate  (90 Base) MCG/ACT inhaler 9 g 0     Si puffs Every 4 (Four) Hours As Needed for Wheezing.        Pharmacy where request should be sent: [address] - [phone]  - [phone] [facility]     Additional details provided by patient:     THE PATIENT SAID SHE HAD THIS REFILLED LAST WEEK AND SHE IS NOT ABLE TO FIND IT. SHE SAID SHE HAS LOST HER INHALER. SHE IS WANTING TO KNOW IF PCP WOULD SEND OVER A NEW SCRIPT TO THE PHARMACY ASAP. THE PATIENT SAID SHE IS GOING OUT OF TOWN TOMORROW.     PLEASE CALL AND ADVISE PATIENT IF THIS CAN BE DONE           Does the patient have less than a 3 day supply:  [x] Yes  [] No    Hope Lena Carter Rep   09/15/22 12:43 EDT

## 2022-09-20 ENCOUNTER — TREATMENT (OUTPATIENT)
Dept: PHYSICAL THERAPY | Facility: CLINIC | Age: 42
End: 2022-09-20

## 2022-09-20 DIAGNOSIS — Z98.890 S/P RIGHT KNEE SURGERY: Primary | ICD-10-CM

## 2022-09-20 DIAGNOSIS — M25.561 RIGHT KNEE PAIN, UNSPECIFIED CHRONICITY: ICD-10-CM

## 2022-09-20 DIAGNOSIS — R26.9 GAIT DISTURBANCE: ICD-10-CM

## 2022-09-20 PROCEDURE — 97140 MANUAL THERAPY 1/> REGIONS: CPT | Performed by: PHYSICAL THERAPIST

## 2022-09-20 PROCEDURE — 97110 THERAPEUTIC EXERCISES: CPT | Performed by: PHYSICAL THERAPIST

## 2022-09-20 NOTE — PROGRESS NOTES
Physical Therapy Progress Note      Patient: Ophelia Velazco   : 1980  Referring practitioner: BILL Curry  Date of Initial Visit: Type: THERAPY  Noted: 2022  Today's Date: 2022  Patient seen for 20 sessions           Subjective   Ophelia Velazco reports: no right leg pain today    Objective          Active Range of Motion     Right Knee   Normal active range of motion    Strength/Myotome Testing     Right Knee   Flexion: 4+  Extension: 4+    Left Knee Flexibility Comments:   Left hamstring tightness      See Exercise, Manual, and Modality Logs for complete treatment.       Assessment & Plan     Assessment  Impairments: abnormal gait, abnormal or restricted ROM, activity intolerance, impaired balance, impaired physical strength, pain with function and weight-bearing intolerance  Functional Limitations: walking, uncomfortable because of pain, standing and unable to perform repetitive tasks  Assessment details: Pt is s/p: R knee tibial plateau ORIF (22) and presents with decreased right knee strength, and difficulty stair negotiation/prolonged walking tolerance. Pt is off of work due to injury. Pt will benefit from skilled PT to address functional deficits and return to PLOF.       Prognosis: good    Goals  Plan Goals: KNEE PROBLEMS:     1. The patient has limited ROM of the R knee.   LTG 1: 12 weeks:  The patient will demonstrate 0 to 135  degrees of ROM for the R knee in order to allow patient to complete prolonged walking, standing, stairs and other ADLs with decreased pain/difficulty.    STATUS:  progressing   STG 1a:  6 weeks:  The patient will demonstrate 0 to 130 degrees of ROM for the R knee.    STATUS:Met    2. The patient has limited strength of the R knee.   LTG 2: 12 weeks: The patient will demonstrate 4+/5 strength for R knee flexion and extension in order to allow patient improved joint stability    STATUS: Met progress to 5/5   STG 2a: 6 weeks: The patient will  demonstrate 4/5 strength for R knee flexion and extension    STATUS: Met   TREATMENT: Manual therapy, therapeutic exercise, home exercise instruction, aquatic therapy, and modalities as needed to include:  moist heat, electrical stimulation, ultrasound, and ice.     3. The patient has gait dysfunction.   LTG 3: 12 weeks:  The patient will ambulate without assistive device, independently, for community distances with minimal limp to the R lower extremity in order to improve mobility and allow patient to perform activities such as grocery shopping with greater ease.    STATUS: Met   TREATMENT: Gait training, aquatic therapy, therapeutic exercise, and home exercise instruction.    4. Mobility: Walking/Moving Around Functional Limitation     LTG 4: 12 weeks:  The patient will demonstrate  LEFS score of 55 in order to decrease limitations.    STATUS:  progressing   STG 4 a: 6 weeks:  The patient will demonstrate  LEFS score of 40 in order to decrease limitations.    STATUS:  Met      Plan  Therapy options: will be seen for skilled therapy services  Planned modality interventions: cryotherapy, TENS and thermotherapy (hydrocollator packs)  Planned therapy interventions: balance/weight-bearing training, flexibility, functional ROM exercises, gait training, home exercise program, joint mobilization, manual therapy, neuromuscular re-education, soft tissue mobilization, strengthening, stretching and therapeutic activities  Frequency: 2x week  Duration in weeks: 4  Treatment plan discussed with: patient        Visit Diagnoses:    ICD-10-CM ICD-9-CM   1. S/P right knee surgery  Z98.890 V45.89   2. Right knee pain, unspecified chronicity  M25.561 719.46   3. Gait disturbance  R26.9 781.2       Progress strengthening /stabilization /functional activity           Timed:  Manual Therapy:    8    mins  42027;  Therapeutic Exercise:    20     mins  82486;     Neuromuscular Micah:        mins  48491;    Therapeutic Activity:           mins  85845;     Gait Training:           mins  72488;     Ultrasound:          mins  96306;    Electrical Stimulation:         mins  14233 ( );    Untimed:  Electrical Stimulation:         mins  04306 ( );  Mechanical Traction:         mins  07155;     Timed Treatment:   28   mins   Total Treatment:     28   mins  Celine Jean PT    Electronically singed 9/20/2022      [place] PT license: [id number]  Physical Therapist

## 2022-09-22 ENCOUNTER — TREATMENT (OUTPATIENT)
Dept: PHYSICAL THERAPY | Facility: CLINIC | Age: 42
End: 2022-09-22

## 2022-09-22 DIAGNOSIS — M25.561 RIGHT KNEE PAIN, UNSPECIFIED CHRONICITY: Primary | ICD-10-CM

## 2022-09-22 DIAGNOSIS — Z98.890 S/P RIGHT KNEE SURGERY: ICD-10-CM

## 2022-09-22 PROCEDURE — 97110 THERAPEUTIC EXERCISES: CPT | Performed by: PHYSICAL THERAPIST

## 2022-09-22 PROCEDURE — 97140 MANUAL THERAPY 1/> REGIONS: CPT | Performed by: PHYSICAL THERAPIST

## 2022-09-22 NOTE — PROGRESS NOTES
"Physical Therapy Daily Treatment Note      Patient: Ophelia Velazco   : 1980  Referring practitioner: BILL Curry  Date of Initial Visit: Type: THERAPY  Noted: 2022  Today's Date: 2022  Patient seen for 21 sessions           Subjective   Ophelia Velazco reports: no right knee pain; just patella \"popping\" with activity      Objective   See Exercise, Manual, and Modality Logs for complete treatment.       Assessment & Plan     Assessment    Assessment details: Patient still having burning pain in patella with full knee extension.         Visit Diagnoses:    ICD-10-CM ICD-9-CM   1. Right knee pain, unspecified chronicity  M25.561 719.46   2. S/P right knee surgery  Z98.890 V45.89       Progress per Plan of Care           Timed:  Manual Therapy:    10     mins  17628;  Therapeutic Exercise:    25     mins  71796;     Neuromuscular Micah:        mins  56326;    Therapeutic Activity:          mins  20507;     Gait Training:           mins  84350;     Ultrasound:          mins  97350;    Electrical Stimulation:         mins  64545 ( );    Untimed:  Electrical Stimulation:         mins  28116 ( );  Mechanical Traction:         mins  41332;     Timed Treatment:   35   mins   Total Treatment:     35   mins  Celine Jean PT    Electronically singed 2022      [place] PT license: [id number]  Physical Therapist  "

## 2022-09-26 ENCOUNTER — TELEPHONE (OUTPATIENT)
Dept: ORTHOPEDIC SURGERY | Facility: CLINIC | Age: 42
End: 2022-09-26

## 2022-09-26 NOTE — TELEPHONE ENCOUNTER
Provider: DR MAURO    Caller: ALINA GRIFFITHS    Relationship to Patient: SELF    Phone Number: [phone]    Reason for Call: PATIENT IS WORKER'S COMP, NEEDS A LETTER FAXED TO HER  STATING THAT SHE IS UNABLE TO RETURN TO WORK DUE TO THE INJURY SUSTAINED ON 4/22/22. W/C IS TRYING TO KEEP FROM PAYING HER WAGES WHILE SHE IS OFF AND THE  NEEDS A LETTER TO GIVE TO THE  STATING THE MEDICAL NECESSITY OF HER BEING OFF OF WORK. FAX ATTN: HARLEY TO #[phone]. PT NEEDS THIS FAXED OVER ASAP. SHE WOULD LIKE A CALL BACK WHEN THIS HAS BEEN SENT.

## 2022-09-27 ENCOUNTER — TELEPHONE (OUTPATIENT)
Dept: PHYSICAL THERAPY | Facility: CLINIC | Age: 42
End: 2022-09-27

## 2022-09-29 ENCOUNTER — TREATMENT (OUTPATIENT)
Dept: PHYSICAL THERAPY | Facility: CLINIC | Age: 42
End: 2022-09-29

## 2022-09-29 DIAGNOSIS — Z98.890 S/P RIGHT KNEE SURGERY: ICD-10-CM

## 2022-09-29 DIAGNOSIS — M25.561 RIGHT KNEE PAIN, UNSPECIFIED CHRONICITY: Primary | ICD-10-CM

## 2022-09-29 DIAGNOSIS — R26.9 GAIT DISTURBANCE: ICD-10-CM

## 2022-09-29 PROCEDURE — 97140 MANUAL THERAPY 1/> REGIONS: CPT | Performed by: PHYSICAL THERAPIST

## 2022-09-29 PROCEDURE — 97110 THERAPEUTIC EXERCISES: CPT | Performed by: PHYSICAL THERAPIST

## 2022-09-29 NOTE — PROGRESS NOTES
Re-Assessment / Re-Certification      Patient: Ophelia Velazco   : 1980  Diagnosis/ICD-10 Code:  Right knee pain, unspecified chronicity [M25.561]  Referring practitioner: BILL Curry  Date of Initial Visit: Type: THERAPY  Noted: 2022  Today's Date: 2022  Patient seen for 22 sessions      Subjective:   Ophelia Velazco reports: right knee tenderness/stiffness today  Subjective Questionnaire: LEFS: 65/80=19% limitation  Clinical Progress: improved  Home Program Compliance: Yes  Treatment has included: therapeutic exercise, manual therapy and therapeutic activity    Subjective   Objective          Active Range of Motion     Right Knee   Normal active range of motion    Strength/Myotome Testing     Right Knee   Flexion: 4+  Extension: 4+    Left Knee Flexibility Comments:   Left hamstring tightness      Assessment & Plan     Assessment  Impairments: abnormal gait, abnormal or restricted ROM, activity intolerance, impaired balance, impaired physical strength, pain with function and weight-bearing intolerance  Functional Limitations: walking, uncomfortable because of pain, standing and unable to perform repetitive tasks  Assessment details: Pt is s/p: R knee tibial plateau ORIF (22) and presents with decreased right knee strength, and difficulty stair negotiation/prolonged walking tolerance. Pt is off of work due to injury. Pt will benefit from skilled PT to address functional deficits and return to PLOF.       Prognosis: good    Goals  Plan Goals: KNEE PROBLEMS:     1. The patient has limited ROM of the R knee.   LTG 1: 12 weeks:  The patient will demonstrate 0 to 135  degrees of ROM for the R knee in order to allow patient to complete prolonged walking, standing, stairs and other ADLs with decreased pain/difficulty.    STATUS:  progressing   STG 1a:  6 weeks:  The patient will demonstrate 0 to 130 degrees of ROM for the R knee.    STATUS:Met    2. The patient has limited strength of  the R knee.   LTG 2: 12 weeks: The patient will demonstrate 4+/5 strength for R knee flexion and extension in order to allow patient improved joint stability    STATUS: Met progress to 5/5   STG 2a: 6 weeks: The patient will demonstrate 4/5 strength for R knee flexion and extension    STATUS: Met   TREATMENT: Manual therapy, therapeutic exercise, home exercise instruction, aquatic therapy, and modalities as needed to include:  moist heat, electrical stimulation, ultrasound, and ice.     3. The patient has gait dysfunction.   LTG 3: 12 weeks:  The patient will ambulate without assistive device, independently, for community distances with minimal limp to the R lower extremity in order to improve mobility and allow patient to perform activities such as grocery shopping with greater ease.    STATUS: Met   TREATMENT: Gait training, aquatic therapy, therapeutic exercise, and home exercise instruction.    4. Mobility: Walking/Moving Around Functional Limitation     LTG 4: 12 weeks:  The patient will demonstrate  LEFS score of 55 in order to decrease limitations.    STATUS: Met; progress to least amount of limitation   STG 4 a: 6 weeks:  The patient will demonstrate  LEFS score of 40 in order to decrease limitations.    STATUS:  Met      Plan  Therapy options: will be seen for skilled therapy services  Planned modality interventions: cryotherapy, TENS and thermotherapy (hydrocollator packs)  Planned therapy interventions: balance/weight-bearing training, flexibility, functional ROM exercises, gait training, home exercise program, joint mobilization, manual therapy, neuromuscular re-education, soft tissue mobilization, strengthening, stretching and therapeutic activities  Frequency: 2x week  Duration in weeks: 4  Treatment plan discussed with: patient        Visit Diagnoses:    ICD-10-CM ICD-9-CM   1. Right knee pain, unspecified chronicity  M25.561 719.46   2. S/P right knee surgery  Z98.890 V45.89   3. Gait disturbance   R26.9 781.2       Progress toward previous goals: Partially Met    Recommendations: Continue as planned  Timeframe: 1 month  Prognosis to achieve goals: good    PT Signature: Celine Jean, PT    Electronically singed 9/29/2022    [place] PT license: [id number]        90 Day Recertification  Certification Period: 9/29/2022 thru 12/27/2022  I certify that the therapy services are furnished while this patient is under my care.  The services outlined above are required by this patient, and will be reviewed every 90 days.    PHYSICIAN: Aleyda Ballesteros PA  NPI: [id number]                                      DATE:     Based upon review of the patient's progress and continued therapy plan, it is my medical opinion that Ophelia Velazco should continue physical therapy treatment at [facility]  [address] [place] [id number]  [phone].    Signature: __________________________________  Aleyda Ballesteros PA    Timed:  Manual Therapy:    8     mins  82566;  Therapeutic Exercise:    20     mins  52758;     Neuromuscular Micah:        mins  18990;    Therapeutic Activity:          mins  04708;     Gait Training:           mins  89038;     Ultrasound:          mins  70584;    Electrical Stimulation:         mins  43414 ( );    Untimed:  Electrical Stimulation:         mins  02816 ( );  Mechanical Traction:         mins  87550;     Timed Treatment:   28   mins   Total Treatment:     28   mins

## 2022-10-10 ENCOUNTER — TREATMENT (OUTPATIENT)
Dept: PHYSICAL THERAPY | Facility: CLINIC | Age: 42
End: 2022-10-10

## 2022-10-10 DIAGNOSIS — R26.9 GAIT DISTURBANCE: ICD-10-CM

## 2022-10-10 DIAGNOSIS — M25.561 RIGHT KNEE PAIN, UNSPECIFIED CHRONICITY: Primary | ICD-10-CM

## 2022-10-10 DIAGNOSIS — Z98.890 S/P RIGHT KNEE SURGERY: ICD-10-CM

## 2022-10-10 PROCEDURE — 97530 THERAPEUTIC ACTIVITIES: CPT | Performed by: PHYSICAL THERAPIST

## 2022-10-10 PROCEDURE — 97140 MANUAL THERAPY 1/> REGIONS: CPT | Performed by: PHYSICAL THERAPIST

## 2022-10-10 PROCEDURE — 97110 THERAPEUTIC EXERCISES: CPT | Performed by: PHYSICAL THERAPIST

## 2022-10-10 NOTE — PROGRESS NOTES
"Physical Therapy Daily Treatment Note        Patient: Ophelia Velazco   : 1980  Diagnosis/ICD-10 Code:  Right knee pain, unspecified chronicity [M25.561]  Referring practitioner: BILL Curry  Date of Initial Visit: Type: THERAPY  Noted: 2022  Today's Date: 10/10/2022  Patient seen for 23 sessions             Subjective     Ophelia Velazco denies having any pain upon arrival today.  She reports primarily having general \"Stiffness\" and \"Soreness\" in her right leg.    She reports that  She did a lot of walking while on fall break with her family and denies having any increased pain.    Objective     Strength/Myotome Testing      Right Knee   Flexion: 5/5  Extension: 4+/5  (Mild functional VMO-Quad weakness)        Left Knee Flexibility Comments:   Left hamstring tightness     See Exercise and Manual Logs for complete treatment.       Assessment/Plan     Pt tolerated therapy session well today- with progression of therapeutic exercises, CKC-Functional activities, and Manual therapy. She has made noble improvements in all areas, but continues to have deficits in her Right Hip/Knee ROM,  Strength, and Stability; limiting function and ability to perform all ADLs without pain or difficulty at this time.   Mild functional weakness  noted with performance of Step up and over trial today.       Progress per Plan of Care           Timed:  Manual Therapy:    8     mins  71561;  Therapeutic Exercise:    23     mins  06065;     Neuromuscular Micah:    0    mins  26191;    Therapeutic Activity:     10     mins  14845;     Gait Trainin     mins  12435;     Ultrasound:     0     mins  19231;    Electrical Stimulation:    0     mins  32291;  Iontophoresis     0     mins  52041    Untimed:  Electrical Stimulation:    0     mins  36939 ( );  Mechanical Traction:    0     mins  21156;   Fluidotherapy     0     mins  07054  Hot pack     0     mins  91455  Cold pack     0     mins  11541    Timed " Treatment:   41   mins   Total Treatment:     41   mins        Neetu Salazar PTA  Physical Therapist Assistant

## 2022-10-12 ENCOUNTER — TREATMENT (OUTPATIENT)
Dept: PHYSICAL THERAPY | Facility: CLINIC | Age: 42
End: 2022-10-12

## 2022-10-12 DIAGNOSIS — M25.561 RIGHT KNEE PAIN, UNSPECIFIED CHRONICITY: Primary | ICD-10-CM

## 2022-10-12 DIAGNOSIS — R26.9 GAIT DISTURBANCE: ICD-10-CM

## 2022-10-12 DIAGNOSIS — Z98.890 S/P RIGHT KNEE SURGERY: ICD-10-CM

## 2022-10-12 PROCEDURE — 97530 THERAPEUTIC ACTIVITIES: CPT | Performed by: PHYSICAL THERAPIST

## 2022-10-12 PROCEDURE — 97140 MANUAL THERAPY 1/> REGIONS: CPT | Performed by: PHYSICAL THERAPIST

## 2022-10-12 PROCEDURE — 97110 THERAPEUTIC EXERCISES: CPT | Performed by: PHYSICAL THERAPIST

## 2022-10-12 NOTE — PROGRESS NOTES
"Physical Therapy Daily Treatment Note        Patient: Ophelia Velazco   : 1980  Diagnosis/ICD-10 Code:  Right knee pain, unspecified chronicity [M25.561]  Referring practitioner: BILL Curry  Date of Initial Visit: Type: THERAPY  Noted: 2022  Today's Date: 10/12/2022  Patient seen for 24 sessions             Subjective   Ophelia Velazco reports having slight increase in \"Clicking\" at right lateral knee upon arrival today.  She rates her pain at 210- stating , \"It's not too bad\".    Objective     + Tightness Right IT band/Hamstring  + Clicking- occasional maltracking of Right patella.    See Exercise and Manual Logs for complete treatment.       Assessment/Plan     Pt tolerated therapy session well today- with progression of therapeutic exercises, CKC-Functional activities, and Manual therapy. She has made notable improvements in all areas, but continues to have some deficits in her Right Hip/Knee ROM,  Strength, and Stability; limiting function and ability to perform all ADLs without pain or difficulty at this time.   Mild functional weakness noted with performance of Step up and over trials.  Mild clicking noted at right patella today with flexion/extension AROM.          Progress per Plan of Care           Timed:  Manual Therapy:    8     mins  34953;  Therapeutic Exercise:    28     mins  55079;     Neuromuscular Micah:    0    mins  15985;    Therapeutic Activity:     10     mins  32054;     Gait Trainin     mins  87495;     Ultrasound:     0     mins  04244;    Electrical Stimulation:    0     mins  76845;  Iontophoresis     0     mins  62397    Untimed:  Electrical Stimulation:    0     mins  69199 ( );  Mechanical Traction:    0     mins  70412;   Fluidotherapy     0     mins  50559  Hot pack     0     mins  77156  Cold pack     0     mins  98333    Timed Treatment:   46   mins   Total Treatment:     46   mins        Neetu Salazar PTA  Physical Therapist Assistant  "

## 2022-10-18 ENCOUNTER — TREATMENT (OUTPATIENT)
Dept: PHYSICAL THERAPY | Facility: CLINIC | Age: 42
End: 2022-10-18

## 2022-10-18 DIAGNOSIS — M25.561 RIGHT KNEE PAIN, UNSPECIFIED CHRONICITY: Primary | ICD-10-CM

## 2022-10-18 DIAGNOSIS — R26.9 GAIT DISTURBANCE: ICD-10-CM

## 2022-10-18 DIAGNOSIS — Z98.890 S/P RIGHT KNEE SURGERY: ICD-10-CM

## 2022-10-18 PROCEDURE — 97110 THERAPEUTIC EXERCISES: CPT | Performed by: PHYSICAL THERAPIST

## 2022-10-18 PROCEDURE — 97530 THERAPEUTIC ACTIVITIES: CPT | Performed by: PHYSICAL THERAPIST

## 2022-10-18 PROCEDURE — 97140 MANUAL THERAPY 1/> REGIONS: CPT | Performed by: PHYSICAL THERAPIST

## 2022-10-19 ENCOUNTER — TELEPHONE (OUTPATIENT)
Dept: ORTHOPEDIC SURGERY | Facility: CLINIC | Age: 42
End: 2022-10-19

## 2022-10-19 RX ORDER — ALBUTEROL SULFATE 90 UG/1
AEROSOL, METERED RESPIRATORY (INHALATION)
Qty: 9 G | Refills: 0 | Status: SHIPPED | OUTPATIENT
Start: 2022-10-19 | End: 2022-11-07

## 2022-10-19 NOTE — TELEPHONE ENCOUNTER
Caller: ALINA GRIFFITHS     Relationship to patient: SELF     Best call back number: [phone]    Patient is needing: PATIENT NEEDS TO SPEAK WITH  IN REGARDS TO RETURNING TO WORK, DUE TO THE FACT HER WORKERS COMP IS ENDING. PATIENT NEEDS TO GO BACK TO WORK ASAP

## 2022-10-24 ENCOUNTER — TELEPHONE (OUTPATIENT)
Dept: ORTHOPEDIC SURGERY | Facility: CLINIC | Age: 42
End: 2022-10-24

## 2022-10-24 ENCOUNTER — OFFICE VISIT (OUTPATIENT)
Dept: ORTHOPEDIC SURGERY | Facility: CLINIC | Age: 42
End: 2022-10-24

## 2022-10-24 VITALS — HEIGHT: 68 IN | WEIGHT: 271.6 LBS | OXYGEN SATURATION: 97 % | BODY MASS INDEX: 41.16 KG/M2 | HEART RATE: 113 BPM

## 2022-10-24 DIAGNOSIS — Z47.89 ORTHOPEDIC AFTERCARE: Primary | ICD-10-CM

## 2022-10-24 PROCEDURE — 99213 OFFICE O/P EST LOW 20 MIN: CPT | Performed by: PHYSICIAN ASSISTANT

## 2022-10-24 NOTE — PROGRESS NOTES
"Chief Complaint  Pain and Follow-up of the Right Knee    Subjective      Ophelia Velazco presents to [facility] for follow-up of right tibial plateau ORIF [place]  She presents today independently ambulatory without use of assistive device.  She feels she is ready for discharge from outpatient physical therapy and has been continuing home exercises on her own.  She feels ready to return to work with full duty.  Patient works as a medical lab technician.  She reports her leg is doing very well.    Objective   No Known Allergies    Vital Signs:   Pulse 113   Ht 172.7 cm (68\")   Wt 123 kg (271 lb 9.6 oz)   SpO2 97%   BMI 41.30 kg/m²       Physical Exam    Constitutional: Awake, alert. Well nourished appearance.    Integumentary: Warm, dry, intact. No obvious rashes.    HENT: Atraumatic, normocephalic.   Respiratory: Non labored respirations .   Cardiovascular: Intact peripheral pulses.    Psychiatric: Normal mood and affect. A&O X3    Ortho Exam  Right knee: Minimal edema.  Well-healed surgical scars noted.  Patellas well tracking.  Knee is stable to varus and valgus stress.  Full knee extension and knee flexion to 125 degrees.  Full plantarflexion and dorsiflexion of the ankle.  Sensation intact to light touch.  Distal neurovascular intact.  Fully weightbearing with nonantalgic gait.    Imaging Results (Most Recent)     Procedure Component Value Units Date/Time    XR Knee 3 View Right [[id number]] Resulted: 10/24/22 1113     Updated: 10/24/22 1114    Narrative:      X-Ray Report:  Study: X-rays ordered, taken in the office, and reviewed today.   Site: Right knee Xray  Indication: ORIF  View: AP/Lateral and [place] view(s)  Findings: Intact right tibial plateau ORIF hardware without evidence of   malfunction or loosening.  Well-healing fracture.   Prior studies available for comparison: yes                       Assessment and Plan   Problem List Items " Addressed This Visit        Musculoskeletal and Injuries    Aftercare following right tibial plateau ORIF - Primary    Relevant Orders    XR Knee 3 View Right (Completed)       Follow Up   Return if symptoms worsen or fail to improve.    Patient Instructions   X-rays reviewed, showing hardware intact. Continue home exercise program to progress strength and ROM. Continue icing knee as needed up to 3-4 times daily for no longer than 15 to 20 minutes at a time.    Work note provided.     Follow-up as needed. Call sooner, if needed with any changes or concerns.       Patient was given instructions and counseling regarding her condition or for health maintenance advice. Please see specific information pulled into the AVS if appropriate.

## 2022-10-24 NOTE — PATIENT INSTRUCTIONS
X-rays reviewed, showing hardware intact. Continue home exercise program to progress strength and ROM. Continue icing knee as needed up to 3-4 times daily for no longer than 15 to 20 minutes at a time.    Work note provided.     Follow-up as needed. Call sooner, if needed with any changes or concerns.

## 2022-10-25 NOTE — TELEPHONE ENCOUNTER
Caller: [employer]    Relationship: WORK COMP      What form or medical record are you requesting: 10.24.22 PROG NOTE, WORK STATUS    Who is requesting this form or medical record from you: WORK COMP    How would you like to receive the form or medical records (pick-up, mail, fax): FAX  If fax, what is the fax number: [phone]  ATTN: KIM    
FAXED.  
normal...

## 2022-11-07 RX ORDER — ALBUTEROL SULFATE 90 UG/1
AEROSOL, METERED RESPIRATORY (INHALATION)
Qty: 9 G | Refills: 0 | Status: SHIPPED | OUTPATIENT
Start: 2022-11-07 | End: 2022-12-14 | Stop reason: SDUPTHER

## 2022-12-14 DIAGNOSIS — E66.01 MORBID (SEVERE) OBESITY DUE TO EXCESS CALORIES: ICD-10-CM

## 2022-12-14 DIAGNOSIS — J18.9 PNEUMONIA OF BOTH LOWER LOBES DUE TO INFECTIOUS ORGANISM: ICD-10-CM

## 2022-12-14 DIAGNOSIS — J45.901 MODERATE ASTHMA WITH ACUTE EXACERBATION, UNSPECIFIED WHETHER PERSISTENT: ICD-10-CM

## 2022-12-14 DIAGNOSIS — E55.9 VITAMIN D DEFICIENCY: ICD-10-CM

## 2022-12-14 DIAGNOSIS — F41.1 ANXIETY, GENERALIZED: ICD-10-CM

## 2022-12-14 DIAGNOSIS — E78.2 MIXED HYPERLIPIDEMIA: ICD-10-CM

## 2022-12-14 DIAGNOSIS — Z90.3 S/P GASTRIC SLEEVE PROCEDURE: ICD-10-CM

## 2022-12-14 DIAGNOSIS — R73.01 IFG (IMPAIRED FASTING GLUCOSE): ICD-10-CM

## 2022-12-14 DIAGNOSIS — F33.1 MAJOR DEPRESSIVE DISORDER, RECURRENT, MODERATE: ICD-10-CM

## 2022-12-14 RX ORDER — ALBUTEROL SULFATE 90 UG/1
2 AEROSOL, METERED RESPIRATORY (INHALATION) EVERY 4 HOURS PRN
Qty: 9 G | Refills: 0 | Status: SHIPPED | OUTPATIENT
Start: 2022-12-14 | End: 2023-02-03 | Stop reason: SDUPTHER

## 2022-12-14 RX ORDER — ESCITALOPRAM OXALATE 10 MG/1
10 TABLET ORAL DAILY
Qty: 90 TABLET | Refills: 3 | Status: SHIPPED | OUTPATIENT
Start: 2022-12-14

## 2022-12-14 NOTE — TELEPHONE ENCOUNTER
Caller: Ophelia Velazco    Relationship: Self    Best call back number: [phone]    Requested Prescriptions:   Requested Prescriptions     Pending Prescriptions Disp Refills   • escitalopram (Lexapro) 10 MG tablet 90 tablet 3     Sig: Take 1 tablet by mouth Daily.   • albuterol sulfate  (90 Base) MCG/ACT inhaler 9 g 0     Si puffs Every 4 (Four) Hours As Needed for Wheezing.        Pharmacy where request should be sent:  [facility]   [address] [place] [address], [phone]    Additional details provided by patient: PATIENT IS OUT OF TOWN ON [place] AND NEEDS THESE TO PRESCRIPTIONS     Does the patient have less than a 3 day supply:  [x] Yes  [] No    Would you like a call back once the refill request has been completed: [x] Yes [] No    If the office needs to give you a call back, can they leave a voicemail: [x] Yes [] No    Lena Suarez Rep   22 09:20 EST

## 2023-01-31 RX ORDER — ALBUTEROL SULFATE 90 UG/1
AEROSOL, METERED RESPIRATORY (INHALATION)
Qty: 9 G | Refills: 0 | OUTPATIENT
Start: 2023-01-31

## 2023-02-03 NOTE — TELEPHONE ENCOUNTER
Caller: Ophelia Velazco    Relationship: Self    Best call back number: [phone]    Requested Prescriptions:   Requested Prescriptions     Pending Prescriptions Disp Refills   • albuterol sulfate  (90 Base) MCG/ACT inhaler 9 g 0     Sig: Inhale 2 puffs Every 4 (Four) Hours As Needed for Wheezing.        Pharmacy where request should be sent: [address] [phone] [facility] [phone]      Additional details provided by patient: PATIENT IS OUT OF MEDICATION    Does the patient have less than a 3 day supply:  [x] Yes  [] No    Would you like a call back once the refill request has been completed: [x] Yes [] No    If the office needs to give you a call back, can they leave a voicemail: [x] Yes [] No    Lena Garcia Rep   02/03/23 09:36 EST

## 2023-02-04 ENCOUNTER — DOCUMENTATION (OUTPATIENT)
Dept: INTERNAL MEDICINE | Facility: CLINIC | Age: 43
End: 2023-02-04
Payer: OTHER MISCELLANEOUS

## 2023-02-04 RX ORDER — ALBUTEROL SULFATE 90 UG/1
2 AEROSOL, METERED RESPIRATORY (INHALATION) EVERY 4 HOURS PRN
Qty: 18 G | Refills: 1 | Status: SHIPPED | OUTPATIENT
Start: 2023-02-04

## 2023-02-06 RX ORDER — ALBUTEROL SULFATE 90 UG/1
AEROSOL, METERED RESPIRATORY (INHALATION)
Qty: 9 G | Refills: 0 | OUTPATIENT
Start: 2023-02-06

## 2023-02-06 RX ORDER — ALBUTEROL SULFATE 90 UG/1
2 AEROSOL, METERED RESPIRATORY (INHALATION) EVERY 4 HOURS PRN
Qty: 9 G | Refills: 0 | Status: SHIPPED | OUTPATIENT
Start: 2023-02-06

## 2023-02-22 ENCOUNTER — TELEPHONE (OUTPATIENT)
Dept: INTERNAL MEDICINE | Facility: CLINIC | Age: 43
End: 2023-02-22
Payer: OTHER MISCELLANEOUS

## 2023-02-22 DIAGNOSIS — J45.901 MODERATE ASTHMA WITH ACUTE EXACERBATION, UNSPECIFIED WHETHER PERSISTENT: ICD-10-CM

## 2023-02-22 RX ORDER — MONTELUKAST SODIUM 10 MG/1
10 TABLET ORAL EVERY MORNING
Qty: 90 TABLET | Refills: 1 | Status: SHIPPED | OUTPATIENT
Start: 2023-02-22

## 2023-02-22 NOTE — TELEPHONE ENCOUNTER
Caller: Ophelia Velazco    Relationship: Self    Best call back number: [phone]    Requested Prescriptions:   Requested Prescriptions     Pending Prescriptions Disp Refills   • montelukast (SINGULAIR) 10 MG tablet 90 tablet 1     Sig: Take 1 tablet by mouth Every Morning.        [address]arm[address] should be sent[address] [facility] - [phone]  - [phone] FX     Additional details provided by patient: PATIENT WILL TAKE LAST DOSE ON 2.23.23     Does the patient have less than a 3 day supply:  [x] Yes  [] No    Would you like a call back once the refill request has been completed: [] Yes [x] No    If the office needs to give you a call back, can they leave a voicemail: [] Yes [] No    Eunice Mahmood, PCT   02/22/23 08:23 EST

## 2023-03-10 RX ORDER — ALBUTEROL SULFATE 90 UG/1
AEROSOL, METERED RESPIRATORY (INHALATION)
Qty: 9 G | Refills: 0 | Status: SHIPPED | OUTPATIENT
Start: 2023-03-10

## 2023-06-09 RX ORDER — ALBUTEROL SULFATE 90 UG/1
AEROSOL, METERED RESPIRATORY (INHALATION)
Qty: 18 G | Refills: 2 | Status: SHIPPED | OUTPATIENT
Start: 2023-06-09

## 2023-10-04 DIAGNOSIS — J45.901 MODERATE ASTHMA WITH ACUTE EXACERBATION, UNSPECIFIED WHETHER PERSISTENT: ICD-10-CM

## 2023-10-04 RX ORDER — MONTELUKAST SODIUM 10 MG/1
TABLET ORAL
Qty: 90 TABLET | Refills: 0 | OUTPATIENT
Start: 2023-10-04

## 2023-10-04 RX ORDER — ALBUTEROL SULFATE 90 UG/1
AEROSOL, METERED RESPIRATORY (INHALATION)
Qty: 18 G | Refills: 0 | OUTPATIENT
Start: 2023-10-04

## 2023-10-06 RX ORDER — MONTELUKAST SODIUM 10 MG/1
10 TABLET ORAL EVERY MORNING
Qty: 30 TABLET | Refills: 0 | Status: SHIPPED | OUTPATIENT
Start: 2023-10-06

## 2023-10-06 NOTE — TELEPHONE ENCOUNTER
This was sent for a 30 day supply, [place] working and she needs this medication to breathe.   She will back in town on November 14th and shew ill call office to make an appt.

## 2024-10-15 ENCOUNTER — DOCUMENTATION (OUTPATIENT)
Dept: PHYSICAL THERAPY | Facility: CLINIC | Age: 44
End: 2024-10-15
Payer: OTHER MISCELLANEOUS

## 2024-10-15 NOTE — PROGRESS NOTES
Outpatient Physical Therapy  [address] [facility] [place], [place] [address]      Discharge Summary  Discharge Summary from Physical Therapy Report      Dates  PT visit: 6/29/22-10/18/22  Number of Visits: 25       Goals  Plan Goals: KNEE PROBLEMS:     1. The patient has limited ROM of the R knee.              LTG 1: 12 weeks:  The patient will demonstrate 0 to 135  degrees of ROM for the R knee in order to allow patient to complete prolonged walking, standing, stairs and other ADLs with decreased pain/difficulty.                          STATUS:  progressing              STG 1a:  6 weeks:  The patient will demonstrate 0 to 130 degrees of ROM for the R knee.                          STATUS:Met    2. The patient has limited strength of the R knee.              LTG 2: 12 weeks: The patient will demonstrate 4+/5 strength for R knee flexion and extension in order to allow patient improved joint stability                          STATUS: Met progress to 5/5              STG 2a: 6 weeks: The patient will demonstrate 4/5 strength for R knee flexion and extension                          STATUS: Met              TREATMENT: Manual therapy, therapeutic exercise, home exercise instruction, aquatic therapy, and modalities as needed to include:  moist heat, electrical stimulation, ultrasound, and ice.                3. The patient has gait dysfunction.              LTG 3: 12 weeks:  The patient will ambulate without assistive device, independently, for community distances with minimal limp to the R lower extremity in order to improve mobility and allow patient to perform activities such as grocery shopping with greater ease.                          STATUS: Met              TREATMENT: Gait training, aquatic therapy, therapeutic exercise, and home exercise instruction.    4. Mobility: Walking/Moving Around Functional Limitation                               LTG 4: 12 weeks:  The patient will demonstrate  LEFS score of 55 in  order to decrease limitations.                          STATUS: Met; progress to least amount of limitation              STG 4 a: 6 weeks:  The patient will demonstrate  LEFS score of 40 in order to decrease limitations.                          STATUS:  Met    Discharge Plan: Continue with current home exercise program as instructed    Date of Discharge 10/15/2024      Electronically signed:   Sherry Higuera PTA  Physical Therapist Assistant  [facility] [place] License #: [id number]